# Patient Record
Sex: FEMALE | Race: WHITE | NOT HISPANIC OR LATINO | Employment: FULL TIME | URBAN - NONMETROPOLITAN AREA
[De-identification: names, ages, dates, MRNs, and addresses within clinical notes are randomized per-mention and may not be internally consistent; named-entity substitution may affect disease eponyms.]

---

## 2017-01-06 ENCOUNTER — OFFICE VISIT (OUTPATIENT)
Dept: INTERNAL MEDICINE | Facility: CLINIC | Age: 47
End: 2017-01-06

## 2017-01-06 VITALS
DIASTOLIC BLOOD PRESSURE: 74 MMHG | OXYGEN SATURATION: 98 % | BODY MASS INDEX: 33.17 KG/M2 | TEMPERATURE: 97.6 F | WEIGHT: 187.19 LBS | RESPIRATION RATE: 16 BRPM | HEIGHT: 63 IN | HEART RATE: 94 BPM | SYSTOLIC BLOOD PRESSURE: 118 MMHG

## 2017-01-06 DIAGNOSIS — J01.10 ACUTE NON-RECURRENT FRONTAL SINUSITIS: Primary | ICD-10-CM

## 2017-01-06 PROBLEM — K21.9 GASTROESOPHAGEAL REFLUX DISEASE WITHOUT ESOPHAGITIS: Status: ACTIVE | Noted: 2017-01-06

## 2017-01-06 PROBLEM — L70.0 ACNE VULGARIS: Status: ACTIVE | Noted: 2017-01-06

## 2017-01-06 LAB
EXPIRATION DATE: NORMAL
FLUAV AG NPH QL: NORMAL
FLUBV AG NPH QL: NORMAL
INTERNAL CONTROL: NORMAL
Lab: NORMAL

## 2017-01-06 PROCEDURE — 87804 INFLUENZA ASSAY W/OPTIC: CPT | Performed by: PHYSICIAN ASSISTANT

## 2017-01-06 PROCEDURE — 99213 OFFICE O/P EST LOW 20 MIN: CPT | Performed by: PHYSICIAN ASSISTANT

## 2017-01-06 RX ORDER — AMOXICILLIN AND CLAVULANATE POTASSIUM 875; 125 MG/1; MG/1
1 TABLET, FILM COATED ORAL 2 TIMES DAILY
Qty: 14 TABLET | Refills: 0 | Status: SHIPPED | OUTPATIENT
Start: 2017-01-06 | End: 2017-01-13

## 2017-01-06 RX ORDER — OMEPRAZOLE 20 MG/1
20 CAPSULE, DELAYED RELEASE ORAL DAILY
COMMUNITY

## 2017-01-06 RX ORDER — SPIRONOLACTONE 50 MG/1
100 TABLET, FILM COATED ORAL 2 TIMES DAILY
COMMUNITY

## 2017-01-06 NOTE — MR AVS SNAPSHOT
Arnol RAMACHANDRAN Catrina   1/6/2017 5:30 PM   Office Visit    Provider:  SAMEER York   Department:  [facility] PRIMARY CARE   Dept Phone:  [phone]                Your Full Care Plan              Today's Medication Changes          These changes are accurate as of: 1/6/17  3:29 PM.  If you have any questions, ask your nurse or doctor.               New Medication(s)Ordered:     amoxicillin-clavulanate 875-125 MG per tablet   Commonly known as:  AUGMENTIN   Take 1 tablet by mouth 2 (Two) Times a Day for 7 days.   Started by:  SAMEER York            Where to Get Your Medications      These medications were sent to [id number] - [place], [place] - [address] AT [facility] of [address] - [phone] PH - [phone] FX  [address], [place] [address]     Phone:  [phone]     amoxicillin-clavulanate 875-125 MG per tablet                  Your Updated Medication List          This list is accurate as of: 1/6/17  3:29 PM.  Always use your most recent med list.                amoxicillin-clavulanate 875-125 MG per tablet   Commonly known as:  AUGMENTIN   Take 1 tablet by mouth 2 (Two) Times a Day for 7 days.       JUNEL 1/20 PO       omeprazole 20 MG capsule   Commonly known as:  priLOSEC       spironolactone 50 MG tablet   Commonly known as:  ALDACTONE               We Performed the Following     POC Influenza A / B       You Were Diagnosed With        Codes Comments    Acute non-recurrent frontal sinusitis    -  Primary ICD-10-CM: J01.10  ICD-9-CM: 461.1       Instructions     None    Patient Instructions History      InLive Interactive Signup     [facility] [employer] allows you to send messages to your doctor, view your test results, renew your prescriptions, schedule appointments, and more. To sign up, go to QR Wild and click on the Sign Up Now link in the New User? box. Enter your InLive Interactive Activation Code exactly as it  "appears below along with the last four digits of your Social Security Number and your Date of Birth () to complete the sign-up process. If you do not sign up before the expiration date, you must request a new code.    [employer] Activation Code: [email or web address]  Expires: 2017  3:29 PM    If you have questions, you can email [email or web address] or call [phone] to talk to our [employer] staff. Remember, [employer] is NOT to be used for urgent needs. For medical emergencies, dial 911.               Other Info from Your Visit           Your Appointments     2017  5:30 PM EST   New Patient with SAMEER York   [facility] PRIMARY [facility]ARE (--)    [address] [place] [id number] [phone]           Bring all previous medical records and films, along with current medications and insurance information.              Allergies     Solodyn [Minocycline Hcl Er]      Sulfa Antibiotics        Reason for Visit     Establish Care sinus, headache, fever, cough.       Vital Signs     Blood Pressure Pulse Temperature Respirations Height Weight    118/74 94 97.6 °F (36.4 °C) 16 63\" (160 cm) 187 lb 3 oz (84.9 kg)    Oxygen Saturation Body Mass Index                98% 33.16 kg/m2          Problems and Diagnoses Noted     Acne    Acid reflux disease    Acute non-recurrent frontal sinusitis    -  Primary      Results     POC Influenza A / B      Component Value Standard Range & Units    Rapid Influenza A Ag neg.     Rapid Influenza B Ag neg.     Internal Control Passed Passed    Lot Number [id number]     Expiration Date 10/30/17                   "

## 2017-01-06 NOTE — PROGRESS NOTES
Subjective   Arnol Fritz is a 46 y.o. female who presents to establish care.    Chief Complaint:  Sinus pressure, headache, fever, fatigue, body aches, sore throat and cough for the last 2 days. Fever up to 102.2 and constant if not taking Tylenol every 4 hours. Has not had influenza vaccine this year. Using Tylenol cold/flu severe which is not helping other than fever. Denies SOA.     Acid reflux. Well controlled with prilosec daily.     Acne. Controlled with Spironolactone. Seeing Dr. Mendez at [facility].         The following portions of the patient's history were reviewed and updated as appropriate: allergies, current medications, past family history, past medical history, past social history, past surgical history and problem list.    Review of Systems  Review of Systems   Constitutional: Positive for chills, fatigue and fever. Negative for appetite change and unexpected weight change.        No malaise   HENT: Positive for congestion, postnasal drip, rhinorrhea, sinus pressure, sneezing and sore throat. Negative for ear pain, hearing loss, nosebleeds, trouble swallowing and voice change.    Eyes: Negative for pain, discharge, redness, itching and visual disturbance.        No dry eyes   Respiratory: Positive for cough. Negative for shortness of breath and wheezing.         No SOB with exertion  No SOB lying down   Cardiovascular: Negative for chest pain, palpitations and leg swelling.        No leg cramps     Gastrointestinal: Negative for abdominal pain, blood in stool, constipation, diarrhea, nausea and vomiting.        Reflux.     No change in bowel habits     Endocrine: Negative for cold intolerance, heat intolerance, polydipsia, polyphagia and polyuria.        No hot flashes  No muscle weakness  No feeling of weakness   Genitourinary: Negative for difficulty urinating, dyspareunia, dysuria, frequency, hematuria, menstrual problem, pelvic pain, urgency, vaginal discharge and vaginal pain.       "  No urinary incontinence  No change in periods   Musculoskeletal: Positive for arthralgias. Negative for joint swelling and myalgias.        No limb pain  No limb swelling   Skin: Negative for rash and wound.        No rash  No lesions  No change in mole  No itching   Neurological: Negative for dizziness, seizures, syncope, weakness, numbness and headaches.        No confusion  No tingling  No trouble walking   Hematological: Negative for adenopathy. Does not bruise/bleed easily.   Psychiatric/Behavioral: Negative for confusion, dysphoric mood, sleep disturbance and suicidal ideas. The patient is not nervous/anxious.         No change in personality         Objective    Visit Vitals   • /74   • Pulse 94   • Temp 97.6 °F (36.4 °C)   • Resp 16   • Ht 63\" (160 cm)   • Wt 187 lb 3 oz (84.9 kg)   • SpO2 98%   • BMI 33.16 kg/m2     Physical Exam   Constitutional: She is oriented to person, place, and time. She appears well-developed and well-nourished. No distress.   HENT:   Head: Normocephalic and atraumatic.   Right Ear: External ear normal.   Left Ear: External ear normal.   Nose: Nose normal.   White postnasal drip. Frontal sinus tenderness.    Eyes: EOM are normal. Pupils are equal, round, and reactive to light.   Neck: Normal range of motion. Neck supple. No thyromegaly present.   Cardiovascular: Normal rate, regular rhythm and normal heart sounds.  Exam reveals no gallop and no friction rub.    No murmur heard.  Pulmonary/Chest: Effort normal and breath sounds normal. No respiratory distress. She has no wheezes. She has no rales. She exhibits no tenderness.   Abdominal: Soft. Bowel sounds are normal. She exhibits no distension and no mass. There is no tenderness. There is no rebound and no guarding. No hernia.   Musculoskeletal: Normal range of motion. She exhibits no edema, tenderness or deformity.   Lymphadenopathy:     She has no cervical adenopathy.   Neurological: She is alert and oriented to person, " place, and time. No cranial nerve deficit. She exhibits normal muscle tone.   Skin: Skin is warm and dry. No rash noted. She is not diaphoretic.   Psychiatric: She has a normal mood and affect. Her behavior is normal. Judgment and thought content normal.   Nursing note and vitals reviewed.        Assessment/Plan      Diagnosis Plan   1. Acute non-recurrent frontal sinusitis  amoxicillin-clavulanate (AUGMENTIN) 875-125 MG per tablet       Influenza A & B: negative.       -not a new patient, unable to find record in the system but she is sure she was here within the last 12 months.

## 2017-01-16 ENCOUNTER — TELEPHONE (OUTPATIENT)
Dept: INTERNAL MEDICINE | Facility: CLINIC | Age: 47
End: 2017-01-16

## 2017-01-16 RX ORDER — FLUCONAZOLE 150 MG/1
150 TABLET ORAL ONCE
Qty: 1 TABLET | Refills: 0 | Status: SHIPPED | OUTPATIENT
Start: 2017-01-16 | End: 2017-01-16

## 2017-01-16 NOTE — TELEPHONE ENCOUNTER
----- Message from Nicole Berg sent at 1/16/2017 10:38 AM EST -----  Contact: Patient   Patient called and stated that she was seen about a week ago with Meghann edwards she mentioned that she could call back if she needed a Rx for Diflucan. She was wanting to see if that could get sent in to the [facility] in [place].

## 2017-03-09 ENCOUNTER — TELEPHONE (OUTPATIENT)
Dept: OBSTETRICS AND GYNECOLOGY | Facility: CLINIC | Age: 47
End: 2017-03-09

## 2017-03-09 RX ORDER — NORETHINDRONE ACETATE AND ETHINYL ESTRADIOL 1; .02 MG/1; MG/1
1 TABLET ORAL DAILY
Qty: 28 TABLET | Refills: 9 | Status: SHIPPED | OUTPATIENT
Start: 2017-03-09 | End: 2017-04-06

## 2017-03-09 NOTE — TELEPHONE ENCOUNTER
----- Message from Helena Messina sent at 3/9/2017  1:17 PM EST -----  Contact: PT  PT IS HAVING PROBLEMS GETTING SCRIPT FILLED.  REQUESTING THE SCRIPT TO BE SENT BACK INTO PHARMACY TO SHOW SHE HAS REFILLS LEFT.     [phone]  DAMIEN ROBERSON

## 2017-03-22 ENCOUNTER — TELEPHONE (OUTPATIENT)
Dept: OBSTETRICS AND GYNECOLOGY | Facility: CLINIC | Age: 47
End: 2017-03-22

## 2017-03-22 NOTE — TELEPHONE ENCOUNTER
The wrong RX was sent in to pt's pharmacy, supposed to be the junel fe (28) day, but the junel (21) was sent in. I just wanted to make sure it was okay to send in the correct medicine with refills.

## 2017-03-23 RX ORDER — NORETHINDRONE ACETATE AND ETHINYL ESTRADIOL 1MG-20(21)
1 KIT ORAL DAILY
Qty: 28 TABLET | Refills: 7 | Status: SHIPPED | OUTPATIENT
Start: 2017-03-23 | End: 2017-08-07 | Stop reason: SDUPTHER

## 2017-04-20 ENCOUNTER — TELEPHONE (OUTPATIENT)
Dept: OBSTETRICS AND GYNECOLOGY | Facility: CLINIC | Age: 47
End: 2017-04-20

## 2017-04-20 NOTE — TELEPHONE ENCOUNTER
----- Message from Yi Maier sent at 4/20/2017  9:49 AM EDT -----  Contact: PT  THIS IS KAROL'S PT.  SHE GOT A CALL FROM [facility] ABOUT NEEDING A F/U BREAST MRI.  CAN WE SEND ORDER, AND DOES IT NEED PRE-CERT?  THANKS

## 2017-04-20 NOTE — TELEPHONE ENCOUNTER
Ok  Patient is due for annual in July so we can discuss then as August would be 6 months time frame when MRI could be done

## 2017-04-20 NOTE — TELEPHONE ENCOUNTER
I spoke with patient, She stated that she has a large family history of breast and cervical cancer, and the  breast center wanted her to go ahead and do the high risk or additional screening. They mentioned waiting till September to do anymore screening so that there was at least 6 months between.

## 2017-04-20 NOTE — TELEPHONE ENCOUNTER
Patient has a normal mammogram in her chart from [facility] done February 2017 so not sure what this is about--need more information-thanks

## 2017-05-19 ENCOUNTER — OFFICE VISIT (OUTPATIENT)
Dept: INTERNAL MEDICINE | Facility: CLINIC | Age: 47
End: 2017-05-19

## 2017-05-19 VITALS
TEMPERATURE: 97.8 F | WEIGHT: 187.2 LBS | HEIGHT: 63 IN | DIASTOLIC BLOOD PRESSURE: 62 MMHG | BODY MASS INDEX: 33.17 KG/M2 | OXYGEN SATURATION: 80 % | SYSTOLIC BLOOD PRESSURE: 117 MMHG | HEART RATE: 100 BPM

## 2017-05-19 DIAGNOSIS — Z00.00 PREVENTATIVE HEALTH CARE: ICD-10-CM

## 2017-05-19 DIAGNOSIS — N92.6 IRREGULAR MENSES: ICD-10-CM

## 2017-05-19 DIAGNOSIS — R53.82 CHRONIC FATIGUE: ICD-10-CM

## 2017-05-19 DIAGNOSIS — E65 BUFFALO HUMP: ICD-10-CM

## 2017-05-19 DIAGNOSIS — E01.0 THYROMEGALY: ICD-10-CM

## 2017-05-19 DIAGNOSIS — R68.89 COLD INTOLERANCE: Primary | ICD-10-CM

## 2017-05-19 DIAGNOSIS — R13.12 OROPHARYNGEAL DYSPHAGIA: ICD-10-CM

## 2017-05-19 PROCEDURE — 99214 OFFICE O/P EST MOD 30 MIN: CPT | Performed by: PHYSICIAN ASSISTANT

## 2017-05-20 LAB
ALBUMIN SERPL-MCNC: 4.2 G/DL (ref 3.5–5)
ALBUMIN/GLOB SERPL: 1.4 G/DL (ref 1–2)
ALP SERPL-CCNC: 56 U/L (ref 38–126)
ALT SERPL-CCNC: 18 U/L (ref 13–69)
AST SERPL-CCNC: 19 U/L (ref 15–46)
BASOPHILS # BLD AUTO: 0.03 10*3/MM3 (ref 0–0.2)
BASOPHILS NFR BLD AUTO: 0.6 % (ref 0–2.5)
BILIRUB SERPL-MCNC: 0.4 MG/DL (ref 0.2–1.3)
BUN SERPL-MCNC: 9 MG/DL (ref 7–20)
BUN/CREAT SERPL: 9 (ref 7.1–23.5)
CALCIUM SERPL-MCNC: 9.8 MG/DL (ref 8.4–10.2)
CHLORIDE SERPL-SCNC: 106 MMOL/L (ref 98–107)
CHOLEST SERPL-MCNC: 198 MG/DL (ref 0–199)
CO2 SERPL-SCNC: 23 MMOL/L (ref 26–30)
CORTIS SERPL-MCNC: 24.8 UG/DL
CREAT SERPL-MCNC: 1 MG/DL (ref 0.6–1.3)
EOSINOPHIL # BLD AUTO: 0.1 10*3/MM3 (ref 0–0.7)
EOSINOPHIL NFR BLD AUTO: 2 % (ref 0–7)
ERYTHROCYTE [DISTWIDTH] IN BLOOD BY AUTOMATED COUNT: 13.4 % (ref 11.5–14.5)
GLOBULIN SER CALC-MCNC: 3.1 GM/DL
GLUCOSE SERPL-MCNC: 109 MG/DL (ref 74–98)
HCT VFR BLD AUTO: 40.8 % (ref 37–47)
HDLC SERPL-MCNC: 43 MG/DL (ref 40–60)
HGB BLD-MCNC: 13.2 G/DL (ref 12–16)
IMM GRANULOCYTES # BLD: 0.01 10*3/MM3 (ref 0–0.06)
IMM GRANULOCYTES NFR BLD: 0.2 % (ref 0–0.6)
LDLC SERPL CALC-MCNC: 122 MG/DL (ref 0–99)
LYMPHOCYTES # BLD AUTO: 1.57 10*3/MM3 (ref 0.6–3.4)
LYMPHOCYTES NFR BLD AUTO: 32 % (ref 10–50)
MCH RBC QN AUTO: 28.4 PG (ref 27–31)
MCHC RBC AUTO-ENTMCNC: 32.4 G/DL (ref 30–37)
MCV RBC AUTO: 87.7 FL (ref 81–99)
MONOCYTES # BLD AUTO: 0.5 10*3/MM3 (ref 0–0.9)
MONOCYTES NFR BLD AUTO: 10.2 % (ref 0–12)
NEUTROPHILS # BLD AUTO: 2.7 10*3/MM3 (ref 2–6.9)
NEUTROPHILS NFR BLD AUTO: 55 % (ref 37–80)
NRBC BLD AUTO-RTO: 0 /100 WBC (ref 0–0)
PLATELET # BLD AUTO: 244 10*3/MM3 (ref 130–400)
POTASSIUM SERPL-SCNC: 4.5 MMOL/L (ref 3.5–5.1)
PROT SERPL-MCNC: 7.3 G/DL (ref 6.3–8.2)
RBC # BLD AUTO: 4.65 10*6/MM3 (ref 4.2–5.4)
SODIUM SERPL-SCNC: 143 MMOL/L (ref 137–145)
T3FREE SERPL-MCNC: 3.2 PG/ML (ref 2–4.4)
T4 SERPL-MCNC: 10.3 UG/DL (ref 4.5–12)
THYROPEROXIDASE AB SERPL-ACNC: 13 IU/ML (ref 0–34)
TRIGL SERPL-MCNC: 164 MG/DL
TSH SERPL DL<=0.005 MIU/L-ACNC: 2.18 MIU/ML (ref 0.47–4.68)
VLDLC SERPL CALC-MCNC: 32.8 MG/DL
WBC # BLD AUTO: 4.91 10*3/MM3 (ref 4.8–10.8)

## 2017-05-22 ENCOUNTER — HOSPITAL ENCOUNTER (OUTPATIENT)
Dept: ULTRASOUND IMAGING | Facility: HOSPITAL | Age: 47
Discharge: HOME | End: 2017-05-22
Admitting: PHYSICIAN ASSISTANT

## 2017-05-22 DIAGNOSIS — E24.9 HYPERCORTISOLISM (HCC): Primary | ICD-10-CM

## 2017-05-22 PROCEDURE — 76536 US EXAM OF HEAD AND NECK: CPT

## 2017-06-01 LAB
CORTIS F 24H UR-MRATE: 17 UG/24 HR (ref 0–50)
CORTIS F UR-MCNC: 29 UG/L

## 2017-08-08 NOTE — TELEPHONE ENCOUNTER
Patient may have one refill of 90 days-please inform her her annual was due in July and will need annual before further refills-thanks

## 2017-08-09 RX ORDER — NORETHINDRONE ACETATE AND ETHINYL ESTRADIOL AND FERROUS FUMARATE 1MG-20(21)
KIT ORAL
Qty: 84 TABLET | Refills: 0 | Status: SHIPPED | OUTPATIENT
Start: 2017-08-09 | End: 2019-05-09 | Stop reason: ALTCHOICE

## 2017-09-06 RX ORDER — PERMETHRIN 50 MG/G
CREAM TOPICAL ONCE
Qty: 60 G | Refills: 1 | Status: SHIPPED | OUTPATIENT
Start: 2017-09-06 | End: 2017-09-06

## 2017-10-25 ENCOUNTER — OFFICE VISIT (OUTPATIENT)
Dept: OBSTETRICS AND GYNECOLOGY | Facility: CLINIC | Age: 47
End: 2017-10-25

## 2017-10-25 VITALS
WEIGHT: 193 LBS | DIASTOLIC BLOOD PRESSURE: 79 MMHG | HEIGHT: 63 IN | SYSTOLIC BLOOD PRESSURE: 112 MMHG | BODY MASS INDEX: 34.2 KG/M2

## 2017-10-25 DIAGNOSIS — R63.5 WEIGHT GAIN: ICD-10-CM

## 2017-10-25 DIAGNOSIS — Z01.419 ENCOUNTER FOR GYNECOLOGICAL EXAMINATION WITHOUT ABNORMAL FINDING: Primary | ICD-10-CM

## 2017-10-25 DIAGNOSIS — Z12.4 SCREENING FOR MALIGNANT NEOPLASM OF CERVIX: ICD-10-CM

## 2017-10-25 DIAGNOSIS — Z84.81 FAMILY HISTORY OF BREAST CANCER GENE MUTATION IN FIRST DEGREE RELATIVE: ICD-10-CM

## 2017-10-25 DIAGNOSIS — Z80.3 FAMILY HISTORY OF BREAST CANCER IN MOTHER: ICD-10-CM

## 2017-10-25 PROCEDURE — 99396 PREV VISIT EST AGE 40-64: CPT | Performed by: PHYSICIAN ASSISTANT

## 2017-10-25 NOTE — PROGRESS NOTES
Subjective   Chief Complaint   Patient presents with   • Gynecologic Exam     Last pap: 16 WNL       Arnol Fritz is a 47 y.o. year old  presenting to be seen for her annual gynecological exam.   She is on microgestin fe  for cycle control and desires to continue-has had tubal-does not use tobacco products  Patient's last menstrual period was 10/16/2017 (exact date).   She reports concern with gradual weight gain, difficulty sleeping-she has had thyroid labs with pcp and were normal in May 2017  She is wondering if complaints due to menopause  She has family history of breast cancer in her mother, maternal grandmother and 3 maternal aunts-her mother had positive genetic testing for breast cancer  Patient does not desire genetic testing for breast cancer  It has been just over 1 year since her last mammogram        Past Medical History:   Diagnosis Date   • Acid reflux    • Anemia    • History of Papanicolaou smear of cervix 2016    WNL        Current Outpatient Prescriptions:   •  MICROGESTIN FE  1-20 MG-MCG per tablet, TAKE 1 TABLET DAILY, Disp: 84 tablet, Rfl: 0  •  omeprazole (priLOSEC) 20 MG capsule, 20 mg Daily., Disp: , Rfl:   •  spironolactone (ALDACTONE) 50 MG tablet, Take 50 mg by mouth 2 (Two) Times a Day., Disp: , Rfl:    Allergies   Allergen Reactions   • Solodyn [Minocycline Hcl Er]    • Sulfa Antibiotics       Past Surgical History:   Procedure Laterality Date   • DILATATION AND CURETTAGE     • TUBAL ABDOMINAL LIGATION     • WISDOM TOOTH EXTRACTION        Social History     Social History   • Marital status:      Spouse name: N/A   • Number of children: N/A   • Years of education: N/A     Occupational History   • Not on file.     Social History Main Topics   • Smoking status: Never Smoker   • Smokeless tobacco: Never Used   • Alcohol use No   • Drug use: No   • Sexual activity: Defer      Comment: Tubal     Other Topics Concern   • Not on file     Social History  "Narrative      Family History   Problem Relation Age of Onset   • Thyroid disease Mother    • Arthritis Father    • Ovarian cancer Neg Hx    • Breast cancer Neg Hx        Review of Systems   Constitutional:        Weight gain   Gastrointestinal: Negative.    Genitourinary: Negative.    Hematological: Bruises/bleeds easily.   Psychiatric/Behavioral: Positive for sleep disturbance.           Objective   /79  Ht 63\" (160 cm)  Wt 193 lb (87.5 kg)  LMP 10/16/2017 (Exact Date)  BMI 34.19 kg/m2    Physical Exam   Constitutional: She appears well-developed and well-nourished. She is cooperative.   Pulmonary/Chest: Right breast exhibits no inverted nipple, no mass, no nipple discharge, no skin change and no tenderness. Left breast exhibits no inverted nipple, no mass, no nipple discharge, no skin change and no tenderness.   Abdominal: Soft. Normal appearance. There is no hepatosplenomegaly. There is no tenderness.   Genitourinary: Vagina normal and uterus normal. There is no tenderness or lesion on the right labia. There is no tenderness or lesion on the left labia. Cervix exhibits no motion tenderness and no discharge. Right adnexum displays no mass and no tenderness. Left adnexum displays no mass and no tenderness.   Neurological: She is alert.   Skin: Skin is warm, dry and intact.   Psychiatric: She has a normal mood and affect. Her behavior is normal.            Assessment and Plan  Arnol was seen today for gynecologic exam.    Diagnoses and all orders for this visit:    Encounter for gynecological examination without abnormal finding    Screening for malignant neoplasm of cervix  -     Liquid-based Pap Smear, Screening - ThinPrep Vial, Cervix; Future  -     Liquid-based Pap Smear, Screening - ThinPrep Vial, Cervix    Family history of breast cancer in mother  -     MRI Breast Bilateral With & Without Contrast; Future    Family history of breast cancer gene mutation in first degree relative  -     MRI Breast " Bilateral With & Without Contrast; Future    Weight gain  -     Estradiol; Future  -     Follicle Stimulating Hormone; Future  -     Luteinizing Hormone; Future  -     Progesterone; Future      Patient Instructions   Given strong family history of breast cancer and her mother with positive genetic testing will try to obtain breast MRI  Patient also desiring labs to check hormone levels-patient is willing to d/c oc's for 6 weeks then will return for labs    Self breast exam monthly  Regular exercise  Annual mammogram              This note was electronically signed.    Shahida Berman PA-C   October 26, 2017

## 2017-10-26 NOTE — PATIENT INSTRUCTIONS
Given strong family history of breast cancer and her mother with positive genetic testing will try to obtain breast MRI  Patient also desiring labs to check hormone levels-patient is willing to d/c oc's for 6 weeks then will return for labs    Self breast exam monthly  Regular exercise  Annual mammogram

## 2017-10-31 ENCOUNTER — RESULTS ENCOUNTER (OUTPATIENT)
Dept: OBSTETRICS AND GYNECOLOGY | Facility: CLINIC | Age: 47
End: 2017-10-31

## 2017-10-31 DIAGNOSIS — R63.5 WEIGHT GAIN: ICD-10-CM

## 2017-11-20 ENCOUNTER — HOSPITAL ENCOUNTER (OUTPATIENT)
Dept: MRI IMAGING | Facility: HOSPITAL | Age: 47
Discharge: HOME | End: 2017-11-20

## 2017-12-01 RX ORDER — FLUCONAZOLE 150 MG/1
150 TABLET ORAL ONCE
Qty: 1 TABLET | Refills: 0 | Status: SHIPPED | OUTPATIENT
Start: 2017-12-01 | End: 2017-12-01

## 2017-12-01 NOTE — TELEPHONE ENCOUNTER
Patient was seen at a [facility] over the holiday and was prescribed an antibiotic. She would like a script called for diflucan sent to [facility].

## 2017-12-05 ENCOUNTER — OFFICE VISIT (OUTPATIENT)
Dept: INTERNAL MEDICINE | Facility: CLINIC | Age: 47
End: 2017-12-05

## 2017-12-05 VITALS
HEIGHT: 63 IN | DIASTOLIC BLOOD PRESSURE: 70 MMHG | OXYGEN SATURATION: 100 % | TEMPERATURE: 98.8 F | BODY MASS INDEX: 33.49 KG/M2 | SYSTOLIC BLOOD PRESSURE: 108 MMHG | WEIGHT: 189 LBS | HEART RATE: 91 BPM

## 2017-12-05 DIAGNOSIS — J22 ACUTE LOWER RESPIRATORY INFECTION: Primary | ICD-10-CM

## 2017-12-05 PROCEDURE — 99213 OFFICE O/P EST LOW 20 MIN: CPT | Performed by: PHYSICIAN ASSISTANT

## 2017-12-05 RX ORDER — AZITHROMYCIN 250 MG/1
TABLET, FILM COATED ORAL
Qty: 6 TABLET | Refills: 0 | Status: SHIPPED | OUTPATIENT
Start: 2017-12-05 | End: 2017-12-29

## 2017-12-05 RX ORDER — DEXTROMETHORPHAN HYDROBROMIDE AND PROMETHAZINE HYDROCHLORIDE 15; 6.25 MG/5ML; MG/5ML
10 SYRUP ORAL NIGHTLY PRN
Qty: 180 ML | Refills: 0 | Status: SHIPPED | OUTPATIENT
Start: 2017-12-05 | End: 2017-12-29

## 2017-12-05 RX ORDER — PREDNISONE 20 MG/1
20 TABLET ORAL DAILY
Qty: 3 TABLET | Refills: 0 | Status: SHIPPED | OUTPATIENT
Start: 2017-12-05 | End: 2017-12-29

## 2017-12-05 NOTE — PROGRESS NOTES
Arnol Fritz is a 47 y.o. female.     Subjective   History of Present Illness   Was treated for bronchitis about 1.5 weeks ago from [facility] and did not improve with Keflex tid x 7 days. Tessalon Perles and Flonase also did not help. She reports SOA, chest tightness, persistent cough, wheezing and green sputum production. Has had chills and fever up to 101 for the last week. Also reports fatigue.       The following portions of the patient's history were reviewed and updated as appropriate: allergies, current medications, past family history, past medical history, past social history, past surgical history and problem list.    Review of Systems    Constitutional: chills, fatigue, fever. Negative for appetite change and unexpected weight change.   HENT: Negative for congestion, ear pain, hearing loss, nosebleeds, postnasal drip, rhinorrhea, sore throat, tinnitus and trouble swallowing.    Eyes: Negative for photophobia, discharge and visual disturbance.   Respiratory: Cough, chest tightness, shortness of breath and wheezing.    Cardiovascular: Negative for chest pain, palpitations and leg swelling.   Gastrointestinal: Negative for abdominal distention, abdominal pain, blood in stool, constipation, diarrhea, nausea and vomiting.   Endocrine: Negative for cold intolerance, heat intolerance, polydipsia, polyphagia and polyuria.   Musculoskeletal: Negative for arthralgias, back pain, joint swelling, myalgias, neck pain and neck stiffness.   Skin: Negative for color change, pallor, rash and wound.   Allergic/Immunologic: Negative for environmental allergies, food allergies and immunocompromised state.   Neurological: Negative for dizziness, tremors, seizures, weakness, numbness and headaches.   Hematological: Negative for adenopathy. Does not bruise/bleed easily.   Psychiatric/Behavioral: Negative for sleep disturbances, agitation, behavioral problems, confusion, hallucinations, self-injury and suicidal ideas.  "The patient is not nervous/anxious.      Objective    Physical Exam  Constitutional: Oriented to person, place, and time. Appears well-developed and well-nourished.   HENT: OP normal. TMs joaquin.   Head: little maxillary sinus tenderness. Normocephalic and atraumatic.   Eyes: EOM are normal. Pupils are equal, round, and reactive to light.   Neck: Normal range of motion. Neck supple.   Cardiovascular: Normal rate, regular rhythm and normal heart sounds.    Pulmonary/Chest: coarse breath sounds with scattered rhonchi. Effort normal. No respiratory distress.  Has no wheezes or rales. Exhibits no chest wall tenderness.   Abdominal: Soft. Bowel sounds are normal. Exhibits no distension and no mass. There is no tenderness.   Musculoskeletal: Normal range of motion. Exhibits no tenderness.   Neurological: Alert and oriented to person, place, and time.   Skin: Skin is warm and dry.   Psychiatric: Has a normal mood and affect. Behavior is normal. Judgment and thought content normal.       /70  Pulse 91  Temp 98.8 °F (37.1 °C)  Ht 160 cm (62.99\")  Wt 85.7 kg (189 lb)  SpO2 100%  BMI 33.49 kg/m2    Nursing note and vitals reviewed.        Assessment/Plan   Arnol was seen today for cough and shortness of breath.    Diagnoses and all orders for this visit:    Acute lower respiratory infection  -     azithromycin (ZITHROMAX Z-CORINNA) 250 MG tablet; Take 2 tablets the first day, then 1 tablet daily for 4 days.  -     predniSONE (DELTASONE) 20 MG tablet; Take 1 tablet by mouth Daily.  -     promethazine-dextromethorphan (PROMETHAZINE-DM) 6.25-15 MG/5ML syrup; Take 10 mL by mouth At Night As Needed for Cough.      Call or RTC if symptoms worsen or persist.          "

## 2017-12-23 LAB
ESTRADIOL SERPL-MCNC: 17.3 PG/ML
FSH SERPL-ACNC: 42.6 MIU/ML
LH SERPL-ACNC: 24.6 MIU/ML
PROGEST SERPL-MCNC: 0.2 NG/ML

## 2017-12-28 ENCOUNTER — TELEPHONE (OUTPATIENT)
Dept: INTERNAL MEDICINE | Facility: CLINIC | Age: 47
End: 2017-12-28

## 2017-12-29 ENCOUNTER — HOSPITAL ENCOUNTER (OUTPATIENT)
Dept: GENERAL RADIOLOGY | Facility: HOSPITAL | Age: 47
Discharge: HOME | End: 2017-12-29
Admitting: PHYSICIAN ASSISTANT

## 2017-12-29 ENCOUNTER — OFFICE VISIT (OUTPATIENT)
Dept: INTERNAL MEDICINE | Facility: CLINIC | Age: 47
End: 2017-12-29

## 2017-12-29 VITALS
HEART RATE: 90 BPM | TEMPERATURE: 98.5 F | WEIGHT: 190 LBS | HEIGHT: 63 IN | BODY MASS INDEX: 33.66 KG/M2 | DIASTOLIC BLOOD PRESSURE: 76 MMHG | SYSTOLIC BLOOD PRESSURE: 110 MMHG | OXYGEN SATURATION: 100 %

## 2017-12-29 DIAGNOSIS — J18.9 PNEUMONIA OF LEFT LOWER LOBE DUE TO INFECTIOUS ORGANISM: Primary | ICD-10-CM

## 2017-12-29 PROCEDURE — 99213 OFFICE O/P EST LOW 20 MIN: CPT | Performed by: PHYSICIAN ASSISTANT

## 2017-12-29 PROCEDURE — 71020 HC CHEST PA AND LATERAL: CPT

## 2017-12-29 RX ORDER — ALBUTEROL SULFATE 90 UG/1
2 AEROSOL, METERED RESPIRATORY (INHALATION) EVERY 4 HOURS PRN
Qty: 1 INHALER | Refills: 2 | Status: SHIPPED | OUTPATIENT
Start: 2017-12-29 | End: 2019-05-09

## 2017-12-29 RX ORDER — DEXTROMETHORPHAN HYDROBROMIDE AND PROMETHAZINE HYDROCHLORIDE 15; 6.25 MG/5ML; MG/5ML
10 SYRUP ORAL NIGHTLY PRN
Qty: 180 ML | Refills: 0 | Status: SHIPPED | OUTPATIENT
Start: 2017-12-29 | End: 2018-03-21

## 2017-12-29 RX ORDER — METHYLPREDNISOLONE 4 MG/1
TABLET ORAL
Qty: 1 EACH | Refills: 0 | Status: SHIPPED | OUTPATIENT
Start: 2017-12-29 | End: 2018-03-21

## 2017-12-29 RX ORDER — CIPROFLOXACIN 500 MG/1
500 TABLET, FILM COATED ORAL EVERY 12 HOURS SCHEDULED
Qty: 14 TABLET | Refills: 0 | Status: SHIPPED | OUTPATIENT
Start: 2017-12-29 | End: 2018-01-05

## 2017-12-29 NOTE — PROGRESS NOTES
Arnol Fritz is a 47 y.o. female.     Subjective   History of Present Illness   Was diagnosed with pneumonia about 3 weeks ago and treated with zithromax, prednisone and promethazine-dm cough syrup which only helped minimally. She continue to cough, have low-grade fevers, chest tightness, fatigue, poor appetite, nasal congestion, wheezing and SOA. Feels like she is smothering when she lays flat. Denies leg edema or chest pain.  Claritin and Flonase help nasal congestion and the mucus has changed from green to clear.         The following portions of the patient's history were reviewed and updated as appropriate: allergies, current medications, past family history, past medical history, past social history, past surgical history and problem list.    Review of Systems    Constitutional:  fatigue, fever. Negative for appetite change, chills and unexpected weight change.   HENT: congestion.  Negative for ear pain, hearing loss, nosebleeds, postnasal drip, rhinorrhea, sore throat, tinnitus and trouble swallowing.    Eyes: Negative for photophobia, discharge and visual disturbance.   Respiratory: Cough, chest tightness, shortness of breath and wheezing.    Cardiovascular: Negative for chest pain, palpitations and leg swelling.   Gastrointestinal: Negative for abdominal distention, abdominal pain, blood in stool, constipation, diarrhea, nausea and vomiting.   Endocrine: Negative for cold intolerance, heat intolerance, polydipsia, polyphagia and polyuria.   Musculoskeletal: Negative for arthralgias, back pain, joint swelling, myalgias, neck pain and neck stiffness.   Skin: Negative for color change, pallor, rash and wound.   Allergic/Immunologic: Negative for environmental allergies, food allergies and immunocompromised state.   Neurological: Negative for dizziness, tremors, seizures, weakness, numbness and headaches.   Hematological: Negative for adenopathy. Does not bruise/bleed easily.   Psychiatric/Behavioral: Negative  "for sleep disturbances, agitation, behavioral problems, confusion, hallucinations, self-injury and suicidal ideas. The patient is not nervous/anxious.      Objective    Physical Exam  Constitutional: Oriented to person, place, and time. Appears well-developed and well-nourished.   HENT: OP normal.   Head: Normocephalic and atraumatic.   Eyes: EOM are normal. Pupils are equal, round, and reactive to light.   Neck: Normal range of motion. Neck supple.   Cardiovascular: Normal rate, regular rhythm and normal heart sounds.    Pulmonary/Chest: rales left upper lobe, few crackles left lower lobe, right lobes normal. Few scattered wheezes. Effort normal.  No respiratory distress.  Exhibits no chest wall tenderness.   Abdominal: Soft. Bowel sounds are normal. Exhibits no distension and no mass. There is no tenderness.   Musculoskeletal: Normal range of motion. Exhibits no tenderness.   Neurological: Alert and oriented to person, place, and time.   Skin: Skin is warm and dry.   Psychiatric: Has a normal mood and affect. Behavior is normal. Judgment and thought content normal.       /76  Pulse 90  Temp 98.5 °F (36.9 °C)  Ht 160 cm (62.99\")  Wt 86.2 kg (190 lb)  SpO2 100%  BMI 33.67 kg/m2    Nursing note and vitals reviewed.        Assessment/Plan   Arnol was seen today for follow-up.    Diagnoses and all orders for this visit:    Pneumonia of left lower lobe due to infectious organism  -     XR Chest PA & Lateral  -     albuterol (PROVENTIL HFA;VENTOLIN HFA) 108 (90 Base) MCG/ACT inhaler; Inhale 2 puffs Every 4 (Four) Hours As Needed for Wheezing or Shortness of Air.  -     MethylPREDNISolone (MEDROL, CORINNA,) 4 MG tablet; Take as directed on package instructions.  -     ciprofloxacin (CIPRO) 500 MG tablet; Take 1 tablet by mouth Every 12 (Twelve) Hours for 7 days.  -     promethazine-dextromethorphan (PROMETHAZINE-DM) 6.25-15 MG/5ML syrup; Take 10 mL by mouth At Night As Needed for Cough.      Call or RTC if " symptoms worsen or persist.

## 2018-03-21 ENCOUNTER — OFFICE VISIT (OUTPATIENT)
Dept: INTERNAL MEDICINE | Facility: CLINIC | Age: 48
End: 2018-03-21

## 2018-03-21 VITALS
HEART RATE: 114 BPM | TEMPERATURE: 99.5 F | BODY MASS INDEX: 34.02 KG/M2 | OXYGEN SATURATION: 98 % | DIASTOLIC BLOOD PRESSURE: 68 MMHG | SYSTOLIC BLOOD PRESSURE: 112 MMHG | WEIGHT: 192 LBS | HEIGHT: 63 IN

## 2018-03-21 DIAGNOSIS — R50.9 FEVER, UNSPECIFIED FEVER CAUSE: Primary | ICD-10-CM

## 2018-03-21 DIAGNOSIS — J10.1 INFLUENZA B: ICD-10-CM

## 2018-03-21 LAB
EXPIRATION DATE: ABNORMAL
FLUAV AG NPH QL: NEGATIVE
FLUBV AG NPH QL: POSITIVE
INTERNAL CONTROL: ABNORMAL
Lab: ABNORMAL

## 2018-03-21 PROCEDURE — 99213 OFFICE O/P EST LOW 20 MIN: CPT | Performed by: PHYSICIAN ASSISTANT

## 2018-03-21 PROCEDURE — 87804 INFLUENZA ASSAY W/OPTIC: CPT | Performed by: PHYSICIAN ASSISTANT

## 2018-03-21 RX ORDER — OSELTAMIVIR PHOSPHATE 75 MG/1
75 CAPSULE ORAL 2 TIMES DAILY
Qty: 10 CAPSULE | Refills: 0 | Status: SHIPPED | OUTPATIENT
Start: 2018-03-21 | End: 2019-05-09

## 2018-03-21 NOTE — PROGRESS NOTES
"Subjective     Chief Complaint: fever, cough    History of Present Illness     Arnol Fritz is a 47 y.o. female presenting with complaints of fever, body aches, HA, sore throat, productive cough beginning yesterday morning. States 2 family members she was visiting with over the weekend were recently diagnosed with flu. She's been using her albuterol inhaler every 4-6 hours for SOA and mucinex to help with congestion.     The following portions of the patient's history were reviewed and updated as appropriate: current medications, allergies, PMH.    Review of Systems   Constitutional: Positive for chills, fatigue and fever.   HENT: Positive for congestion and sore throat.    Respiratory: Positive for cough, shortness of breath and wheezing.    Musculoskeletal: Positive for myalgias.   Neurological: Positive for headaches.       Objective     Vitals:    03/21/18 0946   BP: 112/68   Pulse: 114   Temp: 99.5 °F (37.5 °C)   SpO2: 98%   Weight: 87.1 kg (192 lb)   Height: 160 cm (62.99\")     Physical Exam   Constitutional: Ill appearing.  HENT:   Right Ear: Tympanic membrane and external ear normal.   Left Ear: Tympanic membrane and external ear normal.   Nose: Nose normal.   Mouth/Throat: OP erythema, edema.  Eyes: EOM are normal. Pupils are equal, round, and reactive to light.   Neck: Normal range of motion. Neck supple.   Cardiovascular: Normal rate, regular rhythm and normal heart sounds.    Pulmonary/Chest: Effort normal and breath sounds normal. (used inhaler 1 hour ago)  Abdominal: Soft. Bowel sounds are normal.  Musculoskeletal: Normal range of motion.   Lymphadenopathy: No cervical adenopathy noted.   Neurological: Alert and oriented to person, place, and time.   Skin: Skin is warm and dry.   Psychiatric: Exhibits a normal mood and affect.     Assessment/Plan     Diagnoses and all orders for this visit:    Fever, unspecified fever cause  -     POCT Influenza A/B    Influenza B  -     oseltamivir (TAMIFLU) 75 MG " capsule; Take 1 capsule by mouth 2 (Two) Times a Day.      Influenza B positive.  Encouraged rest, fluids, tylenol.  Return to work protocol discussed.    Lena Ragland PA-C  03/21/2018         Please note that portions of this note were completed with a voice recognition program. Efforts were made to edit dictation, but occasionally words are mistranscribed.

## 2019-04-23 ENCOUNTER — HOSPITAL ENCOUNTER (EMERGENCY)
Facility: HOSPITAL | Age: 49
Discharge: HOME | End: 2019-04-23
Attending: EMERGENCY MEDICINE | Admitting: EMERGENCY MEDICINE

## 2019-04-23 ENCOUNTER — APPOINTMENT (OUTPATIENT)
Dept: GENERAL RADIOLOGY | Facility: HOSPITAL | Age: 49
End: 2019-04-23

## 2019-04-23 VITALS
WEIGHT: 198.4 LBS | SYSTOLIC BLOOD PRESSURE: 120 MMHG | HEART RATE: 84 BPM | DIASTOLIC BLOOD PRESSURE: 84 MMHG | RESPIRATION RATE: 18 BRPM | OXYGEN SATURATION: 95 % | HEIGHT: 62 IN | TEMPERATURE: 98.4 F | BODY MASS INDEX: 36.51 KG/M2

## 2019-04-23 DIAGNOSIS — S39.012A STRAIN OF LUMBAR PARASPINOUS MUSCLE, INITIAL ENCOUNTER: ICD-10-CM
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
## 2019-04-23 DIAGNOSIS — M62.838 CERVICAL PARASPINAL MUSCLE SPASM: ICD-10-CM

## 2019-04-23 PROCEDURE — 72040 X-RAY EXAM NECK SPINE 2-3 VW: CPT

## 2019-04-23 PROCEDURE — 99283 EMERGENCY DEPT VISIT LOW MDM: CPT

## 2019-04-23 RX ORDER — ACETAMINOPHEN 325 MG/1
650 TABLET ORAL EVERY 6 HOURS PRN
Status: DISCONTINUED | OUTPATIENT
Start: 2019-04-23 | End: 2019-04-23 | Stop reason: HOSPADM

## 2019-04-23 RX ORDER — CYCLOBENZAPRINE HCL 5 MG
5 TABLET ORAL 3 TIMES DAILY PRN
Qty: 9 TABLET | Refills: 0 | Status: SHIPPED | OUTPATIENT
Start: 2019-04-23 | End: 2019-04-26

## 2019-04-23 RX ADMIN — ACETAMINOPHEN 650 MG: 325 TABLET, FILM COATED ORAL at 15:47

## 2019-05-09 ENCOUNTER — OFFICE VISIT (OUTPATIENT)
Dept: INTERNAL MEDICINE | Facility: CLINIC | Age: 49
End: 2019-05-09

## 2019-05-09 ENCOUNTER — HOSPITAL ENCOUNTER (OUTPATIENT)
Dept: GENERAL RADIOLOGY | Facility: HOSPITAL | Age: 49
Discharge: HOME | End: 2019-05-09
Admitting: PHYSICIAN ASSISTANT

## 2019-05-09 VITALS
SYSTOLIC BLOOD PRESSURE: 120 MMHG | WEIGHT: 197 LBS | BODY MASS INDEX: 36.25 KG/M2 | RESPIRATION RATE: 16 BRPM | HEART RATE: 71 BPM | TEMPERATURE: 98.2 F | HEIGHT: 62 IN | OXYGEN SATURATION: 98 % | DIASTOLIC BLOOD PRESSURE: 78 MMHG

## 2019-05-09 DIAGNOSIS — M54.6 ACUTE LEFT-SIDED THORACIC BACK PAIN: ICD-10-CM

## 2019-05-09 DIAGNOSIS — S46.812D STRAIN OF LEFT TRAPEZIUS MUSCLE, SUBSEQUENT ENCOUNTER: ICD-10-CM

## 2019-05-09 DIAGNOSIS — M54.50 ACUTE LEFT-SIDED LOW BACK PAIN WITHOUT SCIATICA: ICD-10-CM

## 2019-05-09 DIAGNOSIS — M54.2 CERVICALGIA: ICD-10-CM
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
## 2019-05-09 DIAGNOSIS — G44.209 TENSION HEADACHE: ICD-10-CM

## 2019-05-09 PROCEDURE — 72110 X-RAY EXAM L-2 SPINE 4/>VWS: CPT

## 2019-05-09 PROCEDURE — 72072 X-RAY EXAM THORAC SPINE 3VWS: CPT

## 2019-05-09 PROCEDURE — 99214 OFFICE O/P EST MOD 30 MIN: CPT | Performed by: PHYSICIAN ASSISTANT

## 2019-05-09 RX ORDER — CYCLOBENZAPRINE HCL 10 MG
10 TABLET ORAL 3 TIMES DAILY PRN
Qty: 30 TABLET | Refills: 0 | OUTPATIENT
Start: 2019-05-09 | End: 2020-01-06
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
## 2019-05-30 ENCOUNTER — OFFICE VISIT (OUTPATIENT)
Dept: INTERNAL MEDICINE | Facility: CLINIC | Age: 49
End: 2019-05-30

## 2019-05-30 VITALS
BODY MASS INDEX: 36.07 KG/M2 | TEMPERATURE: 98.6 F | OXYGEN SATURATION: 99 % | SYSTOLIC BLOOD PRESSURE: 110 MMHG | HEART RATE: 98 BPM | DIASTOLIC BLOOD PRESSURE: 72 MMHG | WEIGHT: 196 LBS | HEIGHT: 62 IN

## 2019-05-30 DIAGNOSIS — M54.2 CERVICALGIA: ICD-10-CM
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
## 2019-05-30 DIAGNOSIS — M54.42 ACUTE LEFT-SIDED LOW BACK PAIN WITH LEFT-SIDED SCIATICA: ICD-10-CM

## 2019-05-30 DIAGNOSIS — G44.209 TENSION HEADACHE: ICD-10-CM

## 2019-05-30 DIAGNOSIS — M54.9 UPPER BACK PAIN ON LEFT SIDE: ICD-10-CM

## 2019-05-30 PROCEDURE — 99214 OFFICE O/P EST MOD 30 MIN: CPT | Performed by: PHYSICIAN ASSISTANT
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
## 2019-06-19 ENCOUNTER — HOSPITAL ENCOUNTER (OUTPATIENT)
Dept: MRI IMAGING | Facility: HOSPITAL | Age: 49
Discharge: HOME | End: 2019-06-19
Admitting: PHYSICIAN ASSISTANT

## 2019-06-19 ENCOUNTER — HOSPITAL ENCOUNTER (OUTPATIENT)
Dept: MRI IMAGING | Facility: HOSPITAL | Age: 49
Discharge: HOME | End: 2019-06-19

## 2019-06-19 PROCEDURE — 72141 MRI NECK SPINE W/O DYE: CPT

## 2019-06-19 PROCEDURE — 72148 MRI LUMBAR SPINE W/O DYE: CPT
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
## 2019-06-20 DIAGNOSIS — M54.2 CERVICALGIA: ICD-10-CM

## 2019-06-20 DIAGNOSIS — M50.20 CERVICAL DISC HERNIATION: ICD-10-CM

## 2019-06-20 DIAGNOSIS — G44.209 TENSION HEADACHE: ICD-10-CM

## 2019-06-20 DIAGNOSIS — M54.2 NECK PAIN ON LEFT SIDE: ICD-10-CM

## 2019-06-20 DIAGNOSIS — M50.30 BULGING OF CERVICAL INTERVERTEBRAL DISC: ICD-10-CM

## 2019-06-25 ENCOUNTER — TRANSCRIBE ORDERS (OUTPATIENT)
Dept: MAMMOGRAPHY | Facility: HOSPITAL | Age: 49
End: 2019-06-25

## 2019-06-25 DIAGNOSIS — Z12.39 BREAST CANCER SCREENING: Primary | ICD-10-CM

## 2019-07-08 ENCOUNTER — TREATMENT (OUTPATIENT)
Dept: PHYSICAL THERAPY | Facility: CLINIC | Age: 49
End: 2019-07-08

## 2019-07-08 DIAGNOSIS — M25.512 ACUTE PAIN OF LEFT SHOULDER: ICD-10-CM

## 2019-07-08 DIAGNOSIS — M54.12 RADICULOPATHY, CERVICAL: Primary | ICD-10-CM

## 2019-07-08 DIAGNOSIS — M54.50 ACUTE BILATERAL LOW BACK PAIN WITHOUT SCIATICA: ICD-10-CM

## 2019-07-08 PROCEDURE — 97110 THERAPEUTIC EXERCISES: CPT | Performed by: PHYSICAL THERAPIST

## 2019-07-08 PROCEDURE — 97161 PT EVAL LOW COMPLEX 20 MIN: CPT | Performed by: PHYSICAL THERAPIST
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
## 2019-07-15 ENCOUNTER — TREATMENT (OUTPATIENT)
Dept: PHYSICAL THERAPY | Facility: CLINIC | Age: 49
End: 2019-07-15

## 2019-07-15 DIAGNOSIS — M54.50 ACUTE BILATERAL LOW BACK PAIN WITHOUT SCIATICA: ICD-10-CM

## 2019-07-15 DIAGNOSIS — M54.12 RADICULOPATHY, CERVICAL: Primary | ICD-10-CM

## 2019-07-15 DIAGNOSIS — M25.512 ACUTE PAIN OF LEFT SHOULDER: ICD-10-CM

## 2019-07-15 PROCEDURE — 97014 ELECTRIC STIMULATION THERAPY: CPT | Performed by: PHYSICAL THERAPIST

## 2019-07-15 PROCEDURE — 97110 THERAPEUTIC EXERCISES: CPT | Performed by: PHYSICAL THERAPIST

## 2019-07-15 NOTE — PROGRESS NOTES
Physical Therapy Daily Progress Note      Visit # : 2    Arnol Fritz reports 2-3/10 pain today at rest.  Pt reports a lot of stiffness in the L shoulder and the low back.  L lumbar spine tightness and pain.          Objective Pt presents to PT today with minimal distress noted today.      Pt needed VC's for scapular retraction position.      AROM:  L shoulder supine flexion 129 degrees.     Palpation:  Trial of myofascial release today with no success due to MM guarding and pain.        See Exercise, Manual, and Modality Logs for complete treatment.     Assessment/Plan  Pt with MM guarding still very limiting to manual therapy.  She did seem to be able to do more exercise today.  Check response to the ES upon next visit.       Progress per Plan of Care  Check response to ES and HEP.            Manual Therapy:         mins  90437;  Therapeutic Exercise:    40     mins  23077;     Neuromuscular Ephraim:        mins  95678;    Therapeutic Activity:          mins  52953;     Gait Training:        ___  mins  47418;     Ultrasound:          mins  11086;    Electrical Stimulation:    15     mins  48447 ( );  Dry Needling          mins self-pay    Timed Treatment:   40   mins   Total Treatment:     58   mins    Bhanu Martinez, PT  Physical Therapist

## 2019-07-16 ENCOUNTER — OFFICE VISIT (OUTPATIENT)
Dept: NEUROSURGERY | Facility: CLINIC | Age: 49
End: 2019-07-16

## 2019-07-16 VITALS — BODY MASS INDEX: 36.07 KG/M2 | WEIGHT: 196 LBS | HEIGHT: 62 IN

## 2019-07-16 DIAGNOSIS — S13.9XXA CERVICAL SPRAIN, INITIAL ENCOUNTER: ICD-10-CM

## 2019-07-16 DIAGNOSIS — S33.5XXA LUMBAR SPRAIN, INITIAL ENCOUNTER: Primary | ICD-10-CM

## 2019-07-16 PROCEDURE — 99243 OFF/OP CNSLTJ NEW/EST LOW 30: CPT | Performed by: NEUROLOGICAL SURGERY
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
## 2019-07-19 ENCOUNTER — TREATMENT (OUTPATIENT)
Dept: PHYSICAL THERAPY | Facility: CLINIC | Age: 49
End: 2019-07-19

## 2019-07-19 DIAGNOSIS — M54.50 ACUTE BILATERAL LOW BACK PAIN WITHOUT SCIATICA: ICD-10-CM

## 2019-07-19 DIAGNOSIS — M54.12 RADICULOPATHY, CERVICAL: Primary | ICD-10-CM

## 2019-07-19 DIAGNOSIS — M25.512 ACUTE PAIN OF LEFT SHOULDER: ICD-10-CM

## 2019-07-19 PROCEDURE — 97140 MANUAL THERAPY 1/> REGIONS: CPT | Performed by: PHYSICAL THERAPIST

## 2019-07-19 PROCEDURE — 97110 THERAPEUTIC EXERCISES: CPT | Performed by: PHYSICAL THERAPIST

## 2019-07-19 PROCEDURE — 97014 ELECTRIC STIMULATION THERAPY: CPT | Performed by: PHYSICAL THERAPIST

## 2019-07-19 NOTE — PROGRESS NOTES
Physical Therapy Daily Progress Note      Visit # : 3    Arnol Fritz reports 1/10 pain today at rest.  Neck and shoulders feel better.  The ES seemed to relaxer her more.          Objective Pt presents to PT today with no distress noted.    Pt with improved mobility of the C/S and shoulders.    Palpation: L bicep tendon very tender and guarded.    Shoulder flexion and ROM is the most limited action.     See Exercise, Manual, and Modality Logs for complete treatment.     Assessment/Plan  Pt with improved neck and T/S mobility.  She has much less noted pain as well.         Progress per Plan of Care             Manual Therapy:    9     mins  80846;  Therapeutic Exercise:    38     mins  44215;     Neuromuscular Ephraim:        mins  82000;    Therapeutic Activity:          mins  37672;     Gait Training:        ___  mins  89745;     Ultrasound:          mins  80452;    Electrical Stimulation:    15     mins  30116 ( );  Dry Needling          mins self-pay    Timed Treatment:   47   mins   Total Treatment:     67   mins    Bhanu Martinez, PT  Physical Therapist

## 2019-07-22 ENCOUNTER — TREATMENT (OUTPATIENT)
Dept: PHYSICAL THERAPY | Facility: CLINIC | Age: 49
End: 2019-07-22

## 2019-07-22 DIAGNOSIS — M54.50 ACUTE BILATERAL LOW BACK PAIN WITHOUT SCIATICA: ICD-10-CM

## 2019-07-22 DIAGNOSIS — M25.512 ACUTE PAIN OF LEFT SHOULDER: ICD-10-CM

## 2019-07-22 DIAGNOSIS — M54.12 RADICULOPATHY, CERVICAL: Primary | ICD-10-CM

## 2019-07-22 PROCEDURE — 97110 THERAPEUTIC EXERCISES: CPT | Performed by: PHYSICAL THERAPIST

## 2019-07-26 ENCOUNTER — TREATMENT (OUTPATIENT)
Dept: PHYSICAL THERAPY | Facility: CLINIC | Age: 49
End: 2019-07-26

## 2019-07-26 DIAGNOSIS — M25.512 ACUTE PAIN OF LEFT SHOULDER: ICD-10-CM

## 2019-07-26 DIAGNOSIS — M54.50 ACUTE BILATERAL LOW BACK PAIN WITHOUT SCIATICA: ICD-10-CM

## 2019-07-26 DIAGNOSIS — M54.12 RADICULOPATHY, CERVICAL: Primary | ICD-10-CM

## 2019-07-26 PROCEDURE — 97140 MANUAL THERAPY 1/> REGIONS: CPT | Performed by: PHYSICAL THERAPIST

## 2019-07-26 PROCEDURE — 97110 THERAPEUTIC EXERCISES: CPT | Performed by: PHYSICAL THERAPIST

## 2019-07-26 NOTE — PROGRESS NOTES
Physical Therapy Daily Progress Note      Visit # : 5    Arnol Fritz reports 3/10 pain today at rest.  Pt reports elevated stress this week at work.  Pt with pain more in the UT and neck.  She reports the low back and mid back seems to be better from the new exercises.         Objective Pt presents to PT today with only slight guarding noted with activity.     Pt with B C/T junction and upper T/S HVLAT without elevated pain.    Palpation:  L Levator and L UT.        See Exercise, Manual, and Modality Logs for complete treatment.     Assessment/Plan  Pt with overall improved function and mobility.  She has more localized pain in the L UT and L Levator MM.         Progress per Plan of Care  Check response to the DN and the HVLAT.            Manual Therapy:    16     mins  85419;  Therapeutic Exercise:    40     mins  04446;     Neuromuscular Ephraim:        mins  10440;    Therapeutic Activity:          mins  70167;     Gait Training:        ___  mins  38591;     Ultrasound:          mins  94792;    Electrical Stimulation:         mins  48723 ( );  Dry Needling          mins self-pay    Timed Treatment:   56   mins   Total Treatment:     56   mins    Bahnu Martinez, PT  Physical Therapist

## 2019-07-29 ENCOUNTER — TREATMENT (OUTPATIENT)
Dept: PHYSICAL THERAPY | Facility: CLINIC | Age: 49
End: 2019-07-29

## 2019-07-29 DIAGNOSIS — M54.12 RADICULOPATHY, CERVICAL: Primary | ICD-10-CM

## 2019-07-29 DIAGNOSIS — M54.50 ACUTE BILATERAL LOW BACK PAIN WITHOUT SCIATICA: ICD-10-CM

## 2019-07-29 DIAGNOSIS — M25.512 ACUTE PAIN OF LEFT SHOULDER: ICD-10-CM

## 2019-07-29 PROCEDURE — 97140 MANUAL THERAPY 1/> REGIONS: CPT | Performed by: PHYSICAL THERAPIST

## 2019-07-29 PROCEDURE — 97110 THERAPEUTIC EXERCISES: CPT | Performed by: PHYSICAL THERAPIST

## 2019-07-29 NOTE — PROGRESS NOTES
Physical Therapy Daily Progress Note      Visit # : 6    Arnol Fritz reports 2/10 pain today at rest.  Pt reports she feels more stuff in the L UT area today.          Objective Pt presents to PT today with no distress noted at rest.     Pt with improved function and less pain.     Pt with improved T/S mobility and C/S mobility with less HVLAT noted.    Palpation:  L mid trap and Mid T/S guarded and painful.      See Exercise, Manual, and Modality Logs for complete treatment.     Assessment/Plan  Pt with good response to the T/S mobs and the HVLAT.  It is uncertain if the DN seemed to have any affect on her today.       Progress per Plan of Care  Continue with PT for mobility.            Manual Therapy:    12     mins  14464;  Therapeutic Exercise:    42     mins  04849;     Neuromuscular Ephraim:        mins  99118;    Therapeutic Activity:          mins  96756;     Gait Training:        ___  mins  76745;     Ultrasound:          mins  55949;    Electrical Stimulation:         mins  59856 ( );  Dry Needling          mins self-pay    Timed Treatment:   54   mins   Total Treatment:     54   mins    Bhanu Martinez, PT  Physical Therapist

## 2019-08-19 ENCOUNTER — TREATMENT (OUTPATIENT)
Dept: PHYSICAL THERAPY | Facility: CLINIC | Age: 49
End: 2019-08-19

## 2019-08-19 DIAGNOSIS — M54.12 RADICULOPATHY, CERVICAL: Primary | ICD-10-CM

## 2019-08-19 DIAGNOSIS — M54.50 ACUTE BILATERAL LOW BACK PAIN WITHOUT SCIATICA: ICD-10-CM

## 2019-08-19 PROCEDURE — 97140 MANUAL THERAPY 1/> REGIONS: CPT | Performed by: PHYSICAL THERAPIST

## 2019-08-19 PROCEDURE — 97110 THERAPEUTIC EXERCISES: CPT | Performed by: PHYSICAL THERAPIST

## 2019-08-19 NOTE — PROGRESS NOTES
Physical Therapy Daily Progress Note      Visit # : 7    Arnol Fritz reports 4/10 pain today at rest.  Pt with pain in the L shoulder and neck.  It is consistently tight.          Objective Pt presents to PT today with slight distress.     L UT and L levator scapula MM tenderness noted.    hypomobility noted in the Upper T/S with testing.      See Exercise, Manual, and Modality Logs for complete treatment.     Assessment/Plan  Pt with hypomobility in the upper T/S and the lower C/S.  We attempted more mobs and stretching today with soreness noted immediately.        Progress strengthening /stabilization /functional activity  Check response to elevated Mobs and manual therapy.            Manual Therapy:    24     mins  60738;  Therapeutic Exercise:    26     mins  57006;     Neuromuscular Ephraim:        mins  39821;    Therapeutic Activity:          mins  36902;     Gait Training:        ___  mins  82172;     Ultrasound:          mins  19780;    Electrical Stimulation:         mins  22802 ( );  Dry Needling          mins self-pay    Timed Treatment:   50   mins   Total Treatment:     50   mins    Bhanu Martinez, PT  Physical Therapist

## 2019-08-21 ENCOUNTER — HOSPITAL ENCOUNTER (OUTPATIENT)
Dept: MAMMOGRAPHY | Facility: HOSPITAL | Age: 49
Discharge: HOME | End: 2019-08-21
Admitting: PHYSICIAN ASSISTANT

## 2019-08-21 DIAGNOSIS — Z12.39 BREAST CANCER SCREENING: ICD-10-CM

## 2019-08-21 PROCEDURE — 77067 SCR MAMMO BI INCL CAD: CPT

## 2019-08-21 PROCEDURE — 77063 BREAST TOMOSYNTHESIS BI: CPT

## 2019-08-23 ENCOUNTER — TREATMENT (OUTPATIENT)
Dept: PHYSICAL THERAPY | Facility: CLINIC | Age: 49
End: 2019-08-23

## 2019-08-23 DIAGNOSIS — M54.50 ACUTE BILATERAL LOW BACK PAIN WITHOUT SCIATICA: ICD-10-CM

## 2019-08-23 DIAGNOSIS — M54.12 RADICULOPATHY, CERVICAL: Primary | ICD-10-CM

## 2019-08-23 DIAGNOSIS — M25.512 ACUTE PAIN OF LEFT SHOULDER: ICD-10-CM

## 2019-08-23 PROCEDURE — 97140 MANUAL THERAPY 1/> REGIONS: CPT | Performed by: PHYSICAL THERAPIST

## 2019-08-23 PROCEDURE — 97014 ELECTRIC STIMULATION THERAPY: CPT | Performed by: PHYSICAL THERAPIST

## 2019-08-23 PROCEDURE — 97110 THERAPEUTIC EXERCISES: CPT | Performed by: PHYSICAL THERAPIST

## 2019-08-23 NOTE — PROGRESS NOTES
Physical Therapy Daily Progress Note      Visit # : 8    Arnol Fritz reports 5/10 pain today at rest.  Low back and hip.         Objective Pt presents to PT today with minimal distress noted with resting and with activity.     Palpation: L pec minor tender, L UT tenderness but the tonicity is normal.      PIVM: C/S  L SB tightness noted on the ipsilateral side.        See Exercise, Manual, and Modality Logs for complete treatment.     Assessment/Plan  Pt with improved overall status but she still has soreness noted throughout.  She has L SB tightness and MM guarding noted.       Progress strengthening /stabilization /functional activity            Manual Therapy:    14     mins  19441;  Therapeutic Exercise:    39     mins  03958;     Neuromuscular Ephraim:        mins  80061;    Therapeutic Activity:          mins  04780;     Gait Training:        ___  mins  23985;     Ultrasound:          mins  45916;    Electrical Stimulation:    15     mins  15888 ( );  Dry Needling          mins self-pay    Timed Treatment:   53   mins   Total Treatment:     72   mins    Bhanu Martinez, PT  Physical Therapist

## 2019-08-29 ENCOUNTER — TREATMENT (OUTPATIENT)
Dept: PHYSICAL THERAPY | Facility: CLINIC | Age: 49
End: 2019-08-29

## 2019-08-29 DIAGNOSIS — M54.12 RADICULOPATHY, CERVICAL: Primary | ICD-10-CM

## 2019-08-29 DIAGNOSIS — M25.512 ACUTE PAIN OF LEFT SHOULDER: ICD-10-CM

## 2019-08-29 DIAGNOSIS — M54.50 ACUTE BILATERAL LOW BACK PAIN WITHOUT SCIATICA: ICD-10-CM

## 2019-08-29 PROCEDURE — 97110 THERAPEUTIC EXERCISES: CPT | Performed by: PHYSICAL THERAPIST

## 2019-08-29 PROCEDURE — 97140 MANUAL THERAPY 1/> REGIONS: CPT | Performed by: PHYSICAL THERAPIST

## 2019-08-29 NOTE — PROGRESS NOTES
Physical Therapy Daily Progress Note      Visit # : 9    Arnol Fritz reports 1/10 pain today at rest.  Pt reports she does not sleep well at night.  Stiffness in the neck and the L shoulder.         Objective Pt presents to PT today with minimal distress noted.     Post stretching and mobilizations 4/10 pain noted.     T/S L greater than R for moderate guarding and hypomobility.    See Exercise, Manual, and Modality Logs for complete treatment.     Assessment/Plan  Pt with elevated pain from increased activity.  Pt with hypomobility and guarding minimal in the T/S.       Progress per Plan of Care  Continue after 2 weeks out of town.            Manual Therapy:    16     mins  58741;  Therapeutic Exercise:    39     mins  65117;     Neuromuscular Ephraim:        mins  70786;    Therapeutic Activity:          mins  00379;     Gait Training:        ___  mins  88740;     Ultrasound:          mins  37025;    Electrical Stimulation:         mins  70147 ( );  Dry Needling          mins self-pay    Timed Treatment:   55   mins   Total Treatment:     55   mins    Bhanu Martinez, PT  Physical Therapist

## 2019-12-04 NOTE — PROGRESS NOTES
How Severe Is Your Skin Lesion?: mild Physical Therapy Daily Progress Note      Visit # : 4    Arnol Fritz reports 2-3/10 pain today at rest.  Pt reports the shoulder is feeling a little better.  She feels most of her sxs in the mid back and L posterior shoulder today.         Objective Pt presents to PT today with no distress.     Pt with improved C/S and shoulder sxs and ROM.      Pt with good response to lumbar spine stretching.  Prone over T-Ball + for relief of sxs.        See Exercise, Manual, and Modality Logs for complete treatment.     Assessment/Plan  Pt with improved function and less pain noted.  Pain is more located in the lumbar spine this morning.        Progress per Plan of Care             Manual Therapy:         mins  29614;  Therapeutic Exercise:    54     mins  20897;     Neuromuscular Ephraim:        mins  58432;    Therapeutic Activity:          mins  44897;     Gait Training:        ___  mins  60255;     Ultrasound:          mins  55176;    Electrical Stimulation:         mins  03691 ( );  Dry Needling          mins self-pay    Timed Treatment:   54   mins   Total Treatment:     54   mins    Bhanu Martinez, PT  Physical Therapist         Have Your Skin Lesions Been Treated?: not been treated Is This A New Presentation, Or A Follow-Up?: Skin Lesions

## 2020-01-06 ENCOUNTER — TELEPHONE (OUTPATIENT)
Dept: URGENT CARE | Facility: CLINIC | Age: 50
End: 2020-01-06

## 2020-01-06 NOTE — TELEPHONE ENCOUNTER
----- Message from Jill Zamorano MD sent at 1/6/2020  1:40 PM EST -----  Radiologist confirmed pneumonia in left lung and recommended repeat CXR  Recommend repeat CXR in 4-6 weeks as discussed

## 2020-02-07 DIAGNOSIS — R91.8 OPACITY OF LUNG ON IMAGING STUDY: Primary | ICD-10-CM

## 2020-03-03 ENCOUNTER — HOSPITAL ENCOUNTER (OUTPATIENT)
Dept: GENERAL RADIOLOGY | Facility: HOSPITAL | Age: 50
Discharge: HOME | End: 2020-03-03
Admitting: PHYSICIAN ASSISTANT

## 2020-03-03 PROCEDURE — 71046 X-RAY EXAM CHEST 2 VIEWS: CPT

## 2020-05-13 ENCOUNTER — OFFICE VISIT (OUTPATIENT)
Dept: OBSTETRICS AND GYNECOLOGY | Facility: CLINIC | Age: 50
End: 2020-05-13

## 2020-05-13 VITALS
BODY MASS INDEX: 35.54 KG/M2 | HEIGHT: 63 IN | WEIGHT: 200.6 LBS | DIASTOLIC BLOOD PRESSURE: 80 MMHG | SYSTOLIC BLOOD PRESSURE: 120 MMHG

## 2020-05-13 DIAGNOSIS — N90.7 VULVAR CYST: Primary | ICD-10-CM

## 2020-05-13 PROCEDURE — 99212 OFFICE O/P EST SF 10 MIN: CPT | Performed by: PHYSICIAN ASSISTANT

## 2020-05-13 NOTE — PROGRESS NOTES
Subjective   Chief Complaint   Patient presents with   • Vaginal Lesion     Patient states that she has a spot on her vagina that could possibly be a cyst.       Arnol Fritz is a 49 y.o. year old  presenting to be seen for vulvar lesion she thinks is a cyst.  She reports this has been coming and going since March and at this time it is very small.  She is noticed it seems to flareup with her menses and then go away.  It has drained some blood on occasion.  At this time it feels very small and is not painful or tender.    Past Medical History:   Diagnosis Date   • Acid reflux    • Anemia    • Headache    • History of Papanicolaou smear of cervix 2016    WNL   • Injury of back    • Injury of neck         Current Outpatient Medications:   •  albuterol sulfate  (90 Base) MCG/ACT inhaler, Inhale 2 puffs Every 4 (Four) Hours As Needed for Wheezing., Disp: 1 inhaler, Rfl: 0  •  omeprazole (priLOSEC) 20 MG capsule, 20 mg Daily., Disp: , Rfl:   •  spironolactone (ALDACTONE) 50 MG tablet, Take 100 mg by mouth 2 (Two) Times a Day., Disp: , Rfl:    Allergies   Allergen Reactions   • Sulfa Antibiotics Anaphylaxis     Joint swelling, and closing of airway   • Solodyn [Minocycline Hcl Er] Other (See Comments)     Joint swelling      Past Surgical History:   Procedure Laterality Date   • DILATATION AND CURETTAGE     • TUBAL ABDOMINAL LIGATION     • WISDOM TOOTH EXTRACTION        Social History     Socioeconomic History   • Marital status:      Spouse name: Not on file   • Number of children: Not on file   • Years of education: Not on file   • Highest education level: Not on file   Tobacco Use   • Smoking status: Never Smoker   • Smokeless tobacco: Never Used   Substance and Sexual Activity   • Alcohol use: No   • Drug use: No   • Sexual activity: Yes     Birth control/protection: Surgical     Comment: Tubal      Family History   Problem Relation Age of Onset   • Thyroid disease Mother    • Breast cancer  "Mother    • Arthritis Father    • Cancer Father    • Breast cancer Maternal Grandmother    • Breast cancer Maternal Aunt    • Ovarian cancer Neg Hx        Review of Systems   Constitutional: Negative for chills, diaphoresis and fever.   Gastrointestinal: Negative for abdominal pain, diarrhea, nausea and vomiting.   Genitourinary: Negative for difficulty urinating, dysuria, frequency, menstrual problem, pelvic pain and vaginal discharge.   Musculoskeletal: Negative.            Objective   /80   Ht 160 cm (63\")   Wt 91 kg (200 lb 9.6 oz)   LMP 04/29/2020   Breastfeeding No   BMI 35.53 kg/m²     Physical Exam   Constitutional: She appears well-developed and well-nourished. She is cooperative. No distress.   Eyes: Conjunctivae, EOM and lids are normal.   Genitourinary:         Genitourinary Comments: Very small pea size cyst left labia minora. Nontender, no erythema   Neurological: She is alert.   Skin: Skin is warm and dry. No rash noted.   Psychiatric: She has a normal mood and affect. Her behavior is normal. Thought content normal.            Assessment and Plan  Arnol was seen today for vaginal lesion.    Diagnoses and all orders for this visit:    Vulvar cyst      Patient Instructions   Recommend conservative measures at this time.  Patient is advised to do warm soaks or warm moist compresses if the cyst starts to enlarge or become painful.  She is encouraged to wear loose fitting clothes that are nonrestrictive.  If the area were to become very enlarged or painful would consider incision and drainage in the office.             This note was electronically signed.    Shahida Berman PA-C   May 13, 2020  "

## 2020-05-13 NOTE — PATIENT INSTRUCTIONS
Recommend conservative measures at this time.  Patient is advised to do warm soaks or warm moist compresses if the cyst starts to enlarge or become painful.  She is encouraged to wear loose fitting clothes that are nonrestrictive.  If the area were to become very enlarged or painful would consider incision and drainage in the office.

## 2021-02-05 ENCOUNTER — LAB (OUTPATIENT)
Dept: LAB | Facility: HOSPITAL | Age: 51
End: 2021-02-05

## 2021-02-05 ENCOUNTER — TELEMEDICINE (OUTPATIENT)
Dept: INTERNAL MEDICINE | Facility: CLINIC | Age: 51
End: 2021-02-05

## 2021-02-05 VITALS — TEMPERATURE: 100.2 F

## 2021-02-05 DIAGNOSIS — J06.9 ACUTE URI: ICD-10-CM

## 2021-02-05 DIAGNOSIS — Z20.822 SUSPECTED COVID-19 VIRUS INFECTION: ICD-10-CM

## 2021-02-05 DIAGNOSIS — J06.9 ACUTE URI: Primary | ICD-10-CM

## 2021-02-05 PROCEDURE — U0004 COV-19 TEST NON-CDC HGH THRU: HCPCS

## 2021-02-05 PROCEDURE — 99213 OFFICE O/P EST LOW 20 MIN: CPT | Performed by: PHYSICIAN ASSISTANT

## 2021-02-05 PROCEDURE — C9803 HOPD COVID-19 SPEC COLLECT: HCPCS

## 2021-02-05 RX ORDER — AMOXICILLIN 875 MG/1
875 TABLET, COATED ORAL 2 TIMES DAILY
Qty: 20 TABLET | Refills: 0 | Status: SHIPPED | OUTPATIENT
Start: 2021-02-05 | End: 2021-10-15

## 2021-02-05 RX ORDER — FLUCONAZOLE 150 MG/1
150 TABLET ORAL ONCE
Qty: 1 TABLET | Refills: 0 | Status: SHIPPED | OUTPATIENT
Start: 2021-02-05 | End: 2021-02-05

## 2021-02-05 NOTE — PROGRESS NOTES
You have chosen to receive care through a telehealth visit.  Do you consent to use a video/audio connection for your medical care today? Yes  Active parties: Meghann Montoya PA-C, Raven Pate CMA, Arnol Fritz    Chief Complaint  URI (fever, cough, body aches, can't sleep, congestion, sore throat)    Subjective          Arnol Fritz presents to [facility] for URI symptoms.    History of Present Illness  Patient presents today via video visit with concern of acute onset sore throat yesterday morning followed by onset of fever which have both worsened today.  Today she has also developed nasal congestion, headaches, body aches and cough.  She denies any SOA or loss of taste or smell.  T-max has been 100.6 F.  She has been taking Motrin and Tylenol Cold which helps some until they wear off.  She denies any known exposure to COVID-19. She has been working from home and reports that she has not been away from her home in 3 to 4 weeks, however her  does work outside of the home.        Objective   Vital Signs:   Temp 100.2 °F (37.9 °C)     Physical Exam  Vitals signs and nursing note reviewed.   Constitutional:       General: She is not in acute distress.     Appearance: She is well-developed. She is obese. She is ill-appearing (appears fatigued). She is not toxic-appearing or diaphoretic.   HENT:      Head: Normocephalic and atraumatic.      Right Ear: External ear normal.      Left Ear: External ear normal.   Eyes:      General: No scleral icterus.     Extraocular Movements: Extraocular movements intact.   Neck:      Musculoskeletal: Normal range of motion.   Pulmonary:      Effort: Pulmonary effort is normal. No respiratory distress.   Lymphadenopathy:      Cervical: Cervical adenopathy (bilateral on exam performed by patient as directed by provider) present.   Skin:     Coloration: Skin is not pale.      Findings: No erythema or rash.   Neurological:      Mental Status: She is  alert and oriented to person, place, and time.      Coordination: Coordination normal.   Psychiatric:         Mood and Affect: Mood normal.         Behavior: Behavior normal.         Thought Content: Thought content normal.         Judgment: Judgment normal.              Assessment and Plan    Problem List Items Addressed This Visit     None      Visit Diagnoses     Acute URI    -  Primary    Relevant Medications    Begin for treatment of URI possible pharyngitis: Amoxicillin (AMOXIL) 875 MG tablet    May continue Motrin and Tylenol cold as needed for symptom.  Increase p.o. fluid intake.    Other Relevant Orders    COVID-19 PCR, [facility], NP SWAB IN [facility]SPORT MEDIA 24-30 HR TAT - Swab, Nasopharynx    QUESTIONNAIRE SERIES (Completed)    Suspected COVID-19 virus infection        Relevant Orders    COVID-19 PCR, [facility], NP SWAB IN [facility] TRANSPORT MEDIA 24-30 HR TAT - Swab, Nasopharynx    QUESTIONNAIRE SERIES (Completed)      Quarantine at home for at least 10 days from symptom onset AND until symptoms have improved overall AND fever free for 48 hours without use of fever reducing medications.         I spent 20 minutes caring for Arnol on this date of service. This time includes time spent by me in the following activities:preparing for the visit, obtaining and/or reviewing a separately obtained history, performing a medically appropriate examination and/or evaluation , counseling and educating the patient/family/caregiver, ordering medications, tests, or procedures and documenting information in the medical record  Follow Up   Return for Annual physical.  Patient was given instructions and counseling regarding her condition or for health maintenance advice. Please see specific information pulled into the AVS if appropriate.

## 2021-02-06 LAB — SARS-COV-2 RNA RESP QL NAA+PROBE: NOT DETECTED

## 2021-03-25 ENCOUNTER — APPOINTMENT (OUTPATIENT)
Dept: VACCINE CLINIC | Facility: HOSPITAL | Age: 51
End: 2021-03-25

## 2021-03-25 ENCOUNTER — TRANSCRIBE ORDERS (OUTPATIENT)
Dept: ADMINISTRATIVE | Facility: HOSPITAL | Age: 51
End: 2021-03-25

## 2021-03-25 DIAGNOSIS — Z12.31 VISIT FOR SCREENING MAMMOGRAM: Primary | ICD-10-CM

## 2021-04-29 ENCOUNTER — HOSPITAL ENCOUNTER (OUTPATIENT)
Dept: MAMMOGRAPHY | Facility: HOSPITAL | Age: 51
Discharge: HOME | End: 2021-04-29
Admitting: PHYSICIAN ASSISTANT

## 2021-04-29 DIAGNOSIS — Z12.31 VISIT FOR SCREENING MAMMOGRAM: ICD-10-CM

## 2021-04-29 PROCEDURE — 77063 BREAST TOMOSYNTHESIS BI: CPT

## 2021-04-29 PROCEDURE — 77067 SCR MAMMO BI INCL CAD: CPT

## 2021-10-15 ENCOUNTER — TELEMEDICINE (OUTPATIENT)
Dept: INTERNAL MEDICINE | Facility: CLINIC | Age: 51
End: 2021-10-15

## 2021-10-15 VITALS
HEIGHT: 63 IN | SYSTOLIC BLOOD PRESSURE: 124 MMHG | WEIGHT: 185 LBS | BODY MASS INDEX: 32.78 KG/M2 | TEMPERATURE: 100.1 F | HEART RATE: 73 BPM | DIASTOLIC BLOOD PRESSURE: 78 MMHG

## 2021-10-15 DIAGNOSIS — R05.9 COUGH: Primary | ICD-10-CM

## 2021-10-15 PROCEDURE — 99213 OFFICE O/P EST LOW 20 MIN: CPT | Performed by: INTERNAL MEDICINE

## 2021-10-15 RX ORDER — AMOXICILLIN AND CLAVULANATE POTASSIUM 500; 125 MG/1; MG/1
1 TABLET, FILM COATED ORAL 3 TIMES DAILY
Qty: 21 TABLET | Refills: 0 | Status: SHIPPED | OUTPATIENT
Start: 2021-10-15 | End: 2022-02-10

## 2021-10-15 RX ORDER — TRETINOIN 0.8 MG/G
GEL TOPICAL
COMMUNITY
Start: 2021-08-18

## 2021-10-15 NOTE — PROGRESS NOTES
"Subjective  Arnol Fritz is a 51 y.o. female    HPI complaints of cough and headache with fever denies significant shortness of breath.  She is immunized for Covid.  Does her work from home.  Denies any contact with a sick person.  Non-smoker has tried some OTC meds without much relief history of pneumonias in the past    The following portions of the patient's history were reviewed and updated as appropriate: allergies, current medications, past family history, past medical history, past social history, past surgical history, and problem list.     Review of Systems   Constitutional: Positive for fatigue and fever. Negative for activity change and appetite change.   HENT: Negative for congestion, ear discharge, ear pain and trouble swallowing.    Eyes: Negative for photophobia and visual disturbance.   Respiratory: Positive for cough and shortness of breath.    Cardiovascular: Negative for chest pain and palpitations.   Gastrointestinal: Negative for abdominal distention, abdominal pain, constipation, diarrhea, nausea and vomiting.   Endocrine: Negative.    Genitourinary: Negative for dysuria, hematuria and urgency.   Musculoskeletal: Positive for arthralgias and myalgias. Negative for back pain and joint swelling.   Skin: Negative for color change and rash.   Allergic/Immunologic: Negative.    Neurological: Negative for dizziness, weakness, light-headedness and headaches.   Hematological: Negative for adenopathy. Does not bruise/bleed easily.   Psychiatric/Behavioral: Negative for agitation, confusion and dysphoric mood. The patient is not nervous/anxious.        Visit Vitals  /78 Comment: patient reports   Pulse 73 Comment: patient reports   Temp 100.1 °F (37.8 °C) (Oral) Comment: patient reports - tylenol 30 min ago   Ht 160 cm (63\")   Wt 83.9 kg (185 lb) Comment: patient reports   BMI 32.77 kg/m²       Objective  Physical Exam  Vitals reviewed.   Constitutional:       General: She is not in acute " distress.     Appearance: She is well-developed and well-nourished.   Pulmonary:      Effort: Pulmonary effort is normal.   Psychiatric:         Mood and Affect: Mood and affect normal.       Physical Exam   Constitutional: She appears well-developed and well-nourished. No distress.   Pulmonary/Chest: Effort normal.   Psychiatric: She has a normal mood and affect.   Vitals reviewed.      Diagnoses and all orders for this visit:    Cough conservative measures.  Empiric antibiotic therapy.  Check for Covid  -     COVID-19 PCR, [facility], NP SWAB IN [facility] TRANSPORT MEDIA/ORAL SWISH 24-30 HR TAT - Swab, Nasopharynx; Future    Other orders  -     Retin-A Micro Pump 0.08 % gel

## 2021-10-18 ENCOUNTER — LAB (OUTPATIENT)
Dept: LAB | Facility: HOSPITAL | Age: 51
End: 2021-10-18

## 2021-10-18 DIAGNOSIS — R05.9 COUGH: ICD-10-CM

## 2021-10-18 LAB — SARS-COV-2 RNA NOSE QL NAA+PROBE: NOT DETECTED

## 2021-10-18 PROCEDURE — U0004 COV-19 TEST NON-CDC HGH THRU: HCPCS

## 2022-02-10 ENCOUNTER — TELEMEDICINE (OUTPATIENT)
Dept: INTERNAL MEDICINE | Facility: CLINIC | Age: 52
End: 2022-02-10

## 2022-02-10 DIAGNOSIS — J01.00 ACUTE NON-RECURRENT MAXILLARY SINUSITIS: ICD-10-CM

## 2022-02-10 DIAGNOSIS — U07.1 COVID-19: Primary | ICD-10-CM

## 2022-02-10 PROCEDURE — 99213 OFFICE O/P EST LOW 20 MIN: CPT | Performed by: NURSE PRACTITIONER

## 2022-02-10 RX ORDER — DEXTROMETHORPHAN HYDROBROMIDE AND PROMETHAZINE HYDROCHLORIDE 15; 6.25 MG/5ML; MG/5ML
5 SYRUP ORAL NIGHTLY PRN
Qty: 118 ML | Refills: 0 | Status: SHIPPED | OUTPATIENT
Start: 2022-02-10 | End: 2023-02-23

## 2022-02-10 RX ORDER — AMOXICILLIN AND CLAVULANATE POTASSIUM 875; 125 MG/1; MG/1
1 TABLET, FILM COATED ORAL 2 TIMES DAILY
Qty: 14 TABLET | Refills: 0 | Status: SHIPPED | OUTPATIENT
Start: 2022-02-10 | End: 2022-02-17

## 2022-02-10 RX ORDER — FLUTICASONE PROPIONATE 50 MCG
2 SPRAY, SUSPENSION (ML) NASAL DAILY
Qty: 11.1 ML | Refills: 2 | Status: SHIPPED | OUTPATIENT
Start: 2022-02-10

## 2022-02-10 NOTE — PROGRESS NOTES
You have chosen to receive care through a telephone visit. Do you consent to use a telephone visit for your medical care today? Yes    EDY Herrera; REBECA Reynoso; and the patient were all involved in today's telemedicine visit

## 2022-02-10 NOTE — PROGRESS NOTES
Office Visit      Patient Name: Arnol Fritz  : 1970   MRN: [id number]   Care Team: Patient Care Team:  Alcira Hernandez APRN as PCP - General (Family Medicine)    Chief Complaint  Establish Care (covid positive last, )    Subjective     Subjective      Arnol Fritz presents to [facility] for medication refills and COVID-19 positive.   Tested positive for COVID last Monday.   She is still complaining of cough, shortness of breath, headache, sinus pressure, head and chest congestion, and bilateral otalgia R > L.  Denies fever, body aches, chest pain, and unilateral leg swelling.   She has tried albuterol inhaler, mucinex DM, and sudafed which has not helped her congestion much but did help her headache.   States she gets pneumonia easily and starting to feel similar to how she has felt in the past.     Review of Systems   Constitutional: Positive for fatigue. Negative for chills and fever.   HENT: Positive for congestion and sinus pressure. Negative for postnasal drip, sneezing, sore throat, swollen glands and trouble swallowing.    Respiratory: Positive for cough. Negative for shortness of breath and wheezing.    Cardiovascular: Negative for chest pain.   Gastrointestinal: Negative for abdominal pain, diarrhea, nausea and vomiting.   Musculoskeletal: Negative for arthralgias.   Skin: Negative for rash.   Neurological: Positive for headache.   Psychiatric/Behavioral: Negative for sleep disturbance.        Objective     Objective   Vital Signs:   There were no vitals taken for this visit.  Unable to obtain vital signs  Physical Exam   Constitutional: She appears well-developed and well-nourished. No distress.   HENT:   Nose: Congestion present. Right sinus exhibits maxillary sinus tenderness. Left sinus exhibits maxillary sinus tenderness.   Mouth/Throat: Oropharynx is clear and moist.   Pulmonary/Chest: Effort normal.  No respiratory distress. She no audible  wheeze...  Coughing frequently during exam     Abdominal: Abdomen appears normal. There is no abdominal tenderness.   Neurological: She is alert.   Skin: No rash noted.   Psychiatric: She has a normal mood and affect.        Assessment / Plan         Assessment/Plan  Problem List Items Addressed This Visit     None      Visit Diagnoses     COVID-19    -  Primary    Acute non-recurrent maxillary sinusitis        Due to worsening symptoms and no improvement will treat for suspected sinusitis with augmentin BID x 7 days. Encouraged to take with food. Recommend phenergan-DM only at night for bothersome cough, flonase, mucinex BID, rest, push fluids, and finish full antibiotic regimen. Return if symptoms do not improve with these measures. ER with any chest pain.         I spent 17 minutes caring for Arnol on this date of service. This time includes time spent by me in the following activities:preparing for the visit, performing a medically appropriate examination and/or evaluation , counseling and educating the patient/family/caregiver, ordering medications, tests, or procedures and documenting information in the medical record    Follow Up   Return if symptoms worsen or fail to improve.  Patient was given instructions and counseling regarding her condition or for health maintenance advice. Please see specific information pulled into the AVS if appropriate.     You have chosen to receive care through a telehealth visit.  Do you consent to use a video/audio connection for your medical care today? Yes     EDY Herrera  [facility]

## 2022-04-27 ENCOUNTER — OFFICE VISIT (OUTPATIENT)
Dept: INTERNAL MEDICINE | Facility: CLINIC | Age: 52
End: 2022-04-27

## 2022-04-27 ENCOUNTER — TRANSCRIBE ORDERS (OUTPATIENT)
Dept: ADMINISTRATIVE | Facility: HOSPITAL | Age: 52
End: 2022-04-27

## 2022-04-27 VITALS
HEIGHT: 63 IN | TEMPERATURE: 97.7 F | DIASTOLIC BLOOD PRESSURE: 78 MMHG | SYSTOLIC BLOOD PRESSURE: 122 MMHG | BODY MASS INDEX: 37.17 KG/M2 | WEIGHT: 209.8 LBS | OXYGEN SATURATION: 97 % | HEART RATE: 68 BPM

## 2022-04-27 DIAGNOSIS — R63.5 WEIGHT GAIN, ABNORMAL: ICD-10-CM

## 2022-04-27 DIAGNOSIS — R09.89 THROAT FULLNESS: ICD-10-CM

## 2022-04-27 DIAGNOSIS — R53.83 FATIGUE, UNSPECIFIED TYPE: Primary | ICD-10-CM

## 2022-04-27 DIAGNOSIS — Z12.31 VISIT FOR SCREENING MAMMOGRAM: Primary | ICD-10-CM

## 2022-04-27 DIAGNOSIS — L65.9 HAIR LOSS: ICD-10-CM

## 2022-04-27 DIAGNOSIS — R13.19 OTHER DYSPHAGIA: ICD-10-CM

## 2022-04-27 DIAGNOSIS — E66.09 CLASS 2 OBESITY DUE TO EXCESS CALORIES WITHOUT SERIOUS COMORBIDITY WITH BODY MASS INDEX (BMI) OF 37.0 TO 37.9 IN ADULT: ICD-10-CM

## 2022-04-27 PROCEDURE — 99214 OFFICE O/P EST MOD 30 MIN: CPT | Performed by: NURSE PRACTITIONER

## 2022-04-27 NOTE — PROGRESS NOTES
Office Visit      Patient Name: Arnol Fritz  : 1970   MRN: [id number]   Care Team: Patient Care Team:  Alcira Hernandez APRN as PCP - General (Family Medicine)    Chief Complaint  Fatigue (Issues with her throat, her hair falls out in clumps, weight gain which has worsened, the fatigue has worsened effecting her daily activity )    Subjective     Subjective      Arnol Fritz presents to [facility] to discuss thyroid concerns.  Symptoms have been present for several years, that appear to be worsening.  Endorses chronic fatigue, weight gain, brittle hair/nails, hair loss, difficulty losing weight, difficulty swallowing, fullness in the throat.  Of note she did have COVID-19 in January.  Symptoms have really been worsening over the last year.  Had a full work-up in  that was negative for any thyroid dysfunction, abnormal cortisol and a thyroid ultrasound was performed showing multinodular thyroid without concerning features.  She has not had any recent lab work.  She has a strong family history of thyroid dysfunction her mother had hyperthyroidism and did require removal of the thyroid due to cysts, this was for noncancerous reasons.  She reports she follows a healthy diet, and limits her carbohydrates and sugar intake, walks several miles per day, and watches her portion sizes.  Despite this she continues to gain weight.    Review of Systems   Constitutional: Positive for fatigue. Negative for appetite change and fever.   HENT: Positive for trouble swallowing. Negative for congestion and sore throat.    Eyes: Negative for blurred vision and visual disturbance.   Respiratory: Negative for cough, shortness of breath and wheezing.    Cardiovascular: Negative for chest pain, palpitations and leg swelling.   Gastrointestinal: Positive for constipation and GERD. Negative for abdominal pain, blood in stool, diarrhea and nausea.   Endocrine: Negative for polydipsia, polyphagia  "and polyuria.   Genitourinary: Negative for dysuria.   Musculoskeletal: Positive for arthralgias. Negative for back pain and myalgias.   Skin: Negative for rash.   Neurological: Negative for dizziness, weakness, light-headedness and headache.   Psychiatric/Behavioral: Negative for sleep disturbance and depressed mood. The patient is not nervous/anxious.        Objective     Objective   Vital Signs:   /78   Pulse 68   Temp 97.7 °F (36.5 °C) (Temporal)   Ht 160 cm (63\")   Wt 95.2 kg (209 lb 12.8 oz)   SpO2 97%   BMI 37.16 kg/m²     Physical Exam  Vitals and nursing note reviewed.   Constitutional:       General: She is not in acute distress.     Appearance: Normal appearance. She is obese. She is not toxic-appearing.   Eyes:      Pupils: Pupils are equal, round, and reactive to light.   Neck:      Thyroid: Thyromegaly present. No thyroid mass or thyroid tenderness.      Vascular: No carotid bruit.   Cardiovascular:      Rate and Rhythm: Normal rate and regular rhythm.      Heart sounds: Normal heart sounds. No murmur heard.  Pulmonary:      Effort: Pulmonary effort is normal. No respiratory distress.      Breath sounds: Normal breath sounds. No wheezing.   Abdominal:      General: Bowel sounds are normal. There is no distension.      Palpations: Abdomen is soft.      Tenderness: There is no abdominal tenderness.   Musculoskeletal:      Cervical back: Neck supple. No tenderness.   Skin:     General: Skin is warm and dry.      Findings: No rash.   Neurological:      General: No focal deficit present.      Mental Status: She is alert.   Psychiatric:         Mood and Affect: Mood normal.         Behavior: Behavior normal.          Assessment / Plan      Assessment/Plan   Problem List Items Addressed This Visit    None     Visit Diagnoses     Fatigue, unspecified type    -  Primary    Relevant Orders    CBC No Differential    Comprehensive metabolic panel    TSH Rfx On Abnormal To Free T4    Thyroid Peroxidase " Antibody    Iron and TIBC    Ferritin    Vitamin B12    US Thyroid    Hepatitis C antibody    Cortisol - AM    ACTH    Lipid panel    Hemoglobin A1c    Labs as above to rule out underlying causes.  Recommend sleep hygiene, decrease caffeine intake, and daily sun exposure.  Will escalate work-up as needed.    Hair loss        Relevant Orders    CBC No Differential    Comprehensive metabolic panel    TSH Rfx On Abnormal To Free T4    Thyroid Peroxidase Antibody    Iron and TIBC    Ferritin    Vitamin B12    US Thyroid    Hepatitis C antibody    Cortisol - AM    ACTH    Lipid panel    Hemoglobin A1c    Discussed differentials with her today, likely multifactorial.  Labs as above to rule out underlying causes.  Discussed this could be a complication from recent COVID-19 infection.  Avoid high heat on the hair and frequent hair washing.    Throat fullness        Relevant Orders    CBC No Differential    Comprehensive metabolic panel    TSH Rfx On Abnormal To Free T4    Thyroid Peroxidase Antibody    Iron and TIBC    Ferritin    Vitamin B12    US Thyroid    Hepatitis C antibody    Cortisol - AM    ACTH    Lipid panel    Hemoglobin A1c    Other dysphagia        Relevant Orders    CBC No Differential    Comprehensive metabolic panel    TSH Rfx On Abnormal To Free T4    Thyroid Peroxidase Antibody    Iron and TIBC    Ferritin    Vitamin B12    US Thyroid    Hepatitis C antibody    Cortisol - AM    ACTH    Lipid panel    Hemoglobin A1c    Will update thyroid ultrasound, previous results reviewed and thyromegaly on exam with worsening symptoms.  Will refer as appropriate.  We will need to consider other etiologies such as GERD, already on PPI continue at current dose for now.    Weight gain, abnormal        Relevant Orders    CBC No Differential    Comprehensive metabolic panel    TSH Rfx On Abnormal To Free T4    Thyroid Peroxidase Antibody    Iron and TIBC    Ferritin    Vitamin B12    US Thyroid    Hepatitis C antibody     Cortisol - AM    ACTH    Lipid panel    Hemoglobin A1c    Labs as above today.  Follow-up in 6 weeks.    Class 2 obesity due to excess calories without serious comorbidity with body mass index (BMI) of 37.0 to 37.9 in adult        Relevant Orders    CBC No Differential    Comprehensive metabolic panel    TSH Rfx On Abnormal To Free T4    Thyroid Peroxidase Antibody    Iron and TIBC    Ferritin    Vitamin B12    Hepatitis C antibody    Cortisol - AM    ACTH    Lipid panel    Hemoglobin A1c    Recommend intermittent fasting. Increase lean meats, vegetables, fruit, healthy fats, and water intake. Decrease processed foods, sugar, carbohydrates, soda, and fast food. Educated on portion control and recommendations of moderate intensity exercise 4-5 times/week. This problem will be re-evaluated at next regular visit.              Follow Up   Return in about 6 weeks (around 6/8/2022) for Annual.  Patient was given instructions and counseling regarding her condition or for health maintenance advice. Please see specific information pulled into the AVS if appropriate.     EDY Herrera  [facility] - [place]

## 2022-04-28 LAB
ACTH PLAS-MCNC: 11 PG/ML (ref 7.2–63.3)
ALBUMIN SERPL-MCNC: 4.3 G/DL (ref 3.8–4.8)
ALBUMIN/GLOB SERPL: 1.6 {RATIO} (ref 1.2–2.2)
ALP SERPL-CCNC: 83 IU/L (ref 44–121)
ALT SERPL-CCNC: 17 IU/L (ref 0–32)
AST SERPL-CCNC: 22 IU/L (ref 0–40)
BILIRUB SERPL-MCNC: 0.2 MG/DL (ref 0–1.2)
BUN SERPL-MCNC: 9 MG/DL (ref 6–24)
BUN/CREAT SERPL: 9 (ref 9–23)
CALCIUM SERPL-MCNC: 9.2 MG/DL (ref 8.7–10.2)
CHLORIDE SERPL-SCNC: 106 MMOL/L (ref 96–106)
CHOLEST SERPL-MCNC: 174 MG/DL (ref 100–199)
CO2 SERPL-SCNC: 21 MMOL/L (ref 20–29)
CORTIS AM PEAK SERPL-MCNC: 5.1 UG/DL (ref 6.2–19.4)
CREAT SERPL-MCNC: 1.02 MG/DL (ref 0.57–1)
EGFRCR SERPLBLD CKD-EPI 2021: 67 ML/MIN/1.73
ERYTHROCYTE [DISTWIDTH] IN BLOOD BY AUTOMATED COUNT: 13.9 % (ref 11.7–15.4)
FERRITIN SERPL-MCNC: 23 NG/ML (ref 15–150)
GLOBULIN SER CALC-MCNC: 2.7 G/DL (ref 1.5–4.5)
GLUCOSE SERPL-MCNC: 105 MG/DL (ref 65–99)
HBA1C MFR BLD: 6.2 % (ref 4.8–5.6)
HCT VFR BLD AUTO: 40.4 % (ref 34–46.6)
HCV AB S/CO SERPL IA: <0.1 S/CO RATIO (ref 0–0.9)
HDLC SERPL-MCNC: 33 MG/DL
HGB BLD-MCNC: 13.4 G/DL (ref 11.1–15.9)
IRON SATN MFR SERPL: 13 % (ref 15–55)
IRON SERPL-MCNC: 50 UG/DL (ref 27–159)
LDLC SERPL CALC-MCNC: 120 MG/DL (ref 0–99)
MCH RBC QN AUTO: 28.5 PG (ref 26.6–33)
MCHC RBC AUTO-ENTMCNC: 33.2 G/DL (ref 31.5–35.7)
MCV RBC AUTO: 86 FL (ref 79–97)
PLATELET # BLD AUTO: 261 X10E3/UL (ref 150–450)
POTASSIUM SERPL-SCNC: 4.8 MMOL/L (ref 3.5–5.2)
PROT SERPL-MCNC: 7 G/DL (ref 6–8.5)
RBC # BLD AUTO: 4.71 X10E6/UL (ref 3.77–5.28)
SODIUM SERPL-SCNC: 143 MMOL/L (ref 134–144)
THYROPEROXIDASE AB SERPL-ACNC: <8 IU/ML (ref 0–34)
TIBC SERPL-MCNC: 397 UG/DL (ref 250–450)
TRIGL SERPL-MCNC: 113 MG/DL (ref 0–149)
TSH SERPL DL<=0.005 MIU/L-ACNC: 2.03 UIU/ML (ref 0.45–4.5)
UIBC SERPL-MCNC: 347 UG/DL (ref 131–425)
VIT B12 SERPL-MCNC: 292 PG/ML (ref 232–1245)
VLDLC SERPL CALC-MCNC: 21 MG/DL (ref 5–40)
WBC # BLD AUTO: 5.3 X10E3/UL (ref 3.4–10.8)

## 2022-05-12 ENCOUNTER — HOSPITAL ENCOUNTER (OUTPATIENT)
Dept: MAMMOGRAPHY | Facility: HOSPITAL | Age: 52
End: 2022-05-12

## 2022-05-13 ENCOUNTER — HOSPITAL ENCOUNTER (OUTPATIENT)
Dept: ULTRASOUND IMAGING | Facility: HOSPITAL | Age: 52
Discharge: HOME | End: 2022-05-13
Admitting: NURSE PRACTITIONER

## 2022-05-13 PROCEDURE — 76536 US EXAM OF HEAD AND NECK: CPT

## 2022-06-23 ENCOUNTER — HOSPITAL ENCOUNTER (OUTPATIENT)
Dept: MAMMOGRAPHY | Facility: HOSPITAL | Age: 52
Discharge: HOME | End: 2022-06-23
Admitting: PHYSICIAN ASSISTANT

## 2022-06-23 DIAGNOSIS — Z12.31 VISIT FOR SCREENING MAMMOGRAM: ICD-10-CM

## 2022-06-23 PROCEDURE — 77063 BREAST TOMOSYNTHESIS BI: CPT

## 2022-06-23 PROCEDURE — 77063 BREAST TOMOSYNTHESIS BI: CPT | Performed by: RADIOLOGY

## 2022-06-23 PROCEDURE — 77067 SCR MAMMO BI INCL CAD: CPT | Performed by: RADIOLOGY

## 2022-06-23 PROCEDURE — 77067 SCR MAMMO BI INCL CAD: CPT

## 2023-02-03 ENCOUNTER — OFFICE VISIT (OUTPATIENT)
Dept: OBSTETRICS AND GYNECOLOGY | Facility: CLINIC | Age: 53
End: 2023-02-03
Payer: COMMERCIAL

## 2023-02-03 VITALS
WEIGHT: 198 LBS | SYSTOLIC BLOOD PRESSURE: 134 MMHG | HEIGHT: 63 IN | DIASTOLIC BLOOD PRESSURE: 80 MMHG | BODY MASS INDEX: 35.08 KG/M2

## 2023-02-03 DIAGNOSIS — N39.46 MIXED STRESS AND URGE URINARY INCONTINENCE: ICD-10-CM

## 2023-02-03 DIAGNOSIS — N90.89 PERINEAL FISSURE IN FEMALE: ICD-10-CM

## 2023-02-03 DIAGNOSIS — N95.2 POSTMENOPAUSAL ATROPHIC VAGINITIS: ICD-10-CM

## 2023-02-03 DIAGNOSIS — L81.9 DISCOLORATION OF SKIN: ICD-10-CM

## 2023-02-03 DIAGNOSIS — N95.1 MENOPAUSAL SYMPTOMS: Primary | ICD-10-CM

## 2023-02-03 PROCEDURE — 99214 OFFICE O/P EST MOD 30 MIN: CPT | Performed by: OBSTETRICS & GYNECOLOGY

## 2023-02-03 RX ORDER — ESTRADIOL 0.1 MG/G
CREAM VAGINAL
Qty: 1 EACH | Refills: 11 | Status: SHIPPED | OUTPATIENT
Start: 2023-02-03

## 2023-02-03 NOTE — PROGRESS NOTES
Chief Complaint  Menopause (Patient complains of hot flashes, difficulty sleeping, painful intercourse. Patient also complains of perineal lesion x 6 weeks. )     History of Present Illness:  Patient is 52 y.o.  who presents to [facility] for evaluation of menopausal symptoms as well as a perineal lesion.  Patient reports she has been having menopausal symptoms for the last several years.  She has had recent worsening of her hot flashes night sweats and difficulty sleeping.  She is having vaginal dryness as well as pain with intercourse.  She does report having a lesion on the perineum which has been present for the last 6 weeks.  She reports it has been painful.  She has noticed bloody drainage from the lesion.  She does report having a strong family history of thyroid disorder.  Her maternal grandmother and mother both have had their thyroids removed.  Her sister has hypothyroidism.  Patient is desiring to have hormone levels checked.  She has not had any recent laboratory assessment.  Patient is on spironolactone for her acne.  She reports she has not had a menstrual cycle since March of last year.  She also has complaints of urinary incontinence.  She has both stress as well as urge incontinence.    History  Past Medical History:   Diagnosis Date   • Acid reflux    • Anemia    • Headache    • History of Papanicolaou smear of cervix 2016    WNL   • Injury of back    • Injury of neck      Current Outpatient Medications on File Prior to Visit   Medication Sig Dispense Refill   • albuterol sulfate  (90 Base) MCG/ACT inhaler Inhale 2 puffs Every 4 (Four) Hours As Needed for Wheezing. 1 inhaler 0   • fluticasone (Flonase) 50 MCG/ACT nasal spray 2 sprays into the nostril(s) as directed by provider Daily. 11.1 mL 2   • omeprazole (priLOSEC) 20 MG capsule 20 mg Daily.     • promethazine-dextromethorphan (PROMETHAZINE-DM) 6.25-15 MG/5ML syrup Take 5 mL by mouth At Night As  "Needed for Cough. 118 mL 0   • promethazine-dextromethorphan (PROMETHAZINE-DM) 6.25-15 MG/5ML syrup 1 tsp q 6h prn cough 120 mL 0   • Retin-A Micro Pump 0.08 % gel      • spironolactone (ALDACTONE) 50 MG tablet Take 100 mg by mouth 2 (Two) Times a Day.       No current facility-administered medications on file prior to visit.     Allergies   Allergen Reactions   • Sulfa Antibiotics Anaphylaxis     Joint swelling, and closing of airway   • Solodyn [Minocycline Hcl Er] Other (See Comments)     Joint swelling     Past Surgical History:   Procedure Laterality Date   • DILATATION AND CURETTAGE     • TUBAL ABDOMINAL LIGATION     • WISDOM TOOTH EXTRACTION       Family History   Problem Relation Age of Onset   • Breast cancer Mother 65   • Thyroid disease Mother    • Arthritis Father    • Cancer Father    • Breast cancer Maternal Grandmother    • Breast cancer Maternal Aunt    • Ovarian cancer Neg Hx      Social History     Socioeconomic History   • Marital status:    Tobacco Use   • Smoking status: Never   • Smokeless tobacco: Never   Vaping Use   • Vaping Use: Never used   Substance and Sexual Activity   • Alcohol use: No   • Drug use: No   • Sexual activity: Yes     Birth control/protection: Surgical     Comment: Tubal       Physical Examination:  Vital Signs: /80   Ht 160 cm (63\")   Wt 89.8 kg (198 lb)   BMI 35.07 kg/m²     General Appearance: alert, appears stated age, and cooperative  Breasts: Not performed  Abdomen: no masses, no hepatomegaly, no splenomegaly, soft non-tender, no guarding, and no rebound tenderness  Pelvic: Clinical staff was present for exam  External genitalia: Skin discoloration of the labia.  Loss of pigment noted.  Positive parchment with fissure and adhesion noted of the perineum.  Positive tenderness to touch.  :  urethral meatus normal;  Vaginal: Atrophic changes noted; small nodule noted left perineum approx 1 cm; no drainage noted  Cervix:  normal appearance.  Uterus:  " normal size, shape and consistency.  Adnexa:  normal bimanual exam of the adnexa.  Rectal: Confirms findings    Data Review:  The following data was reviewed by: Letty Hunt MD on 02/03/2023:     Labs:  CBC No Differential (04/27/2022 09:06)  Comprehensive metabolic panel (04/27/2022 09:06)  TSH Rfx On Abnormal To Free T4 (04/27/2022 09:06)  Thyroid Peroxidase Antibody (04/27/2022 09:06)  Iron and TIBC (04/27/2022 09:06)  Ferritin (04/27/2022 09:06)  Vitamin B12 (04/27/2022 09:06)  Hepatitis C antibody (04/27/2022 09:06)  Cortisol - AM (04/27/2022 09:06)  ACTH (04/27/2022 09:06)  Lipid panel (04/27/2022 09:06)  Hemoglobin A1c (04/27/2022 09:06)    Imaging:    Medical Records:  None    Assessment and Plan   1. Menopausal symptoms  The various options for the management of menopausal symptoms was discussed.  The medical treatment options discussed include HRT, SSRIs, SSNRIs, clonidine, and gabapentin.  The risks and benefits were discussed including the findings from the WHI study.  The increased risk of breast cancer, CAD, stroke, and VTE events were discussed for combination therapy vs the increased risk of CV events and breast cancer not being seen in the estrogen only group.  The lowest effective dose for the shortest duration of treatment was discussed in regards to HRT.  Other alternatives including otc supplements and lifestyle changes were also discussed.  Local estrogen therapy to relieve atrophic vaginal symptoms was discussed was well as other alternatives.  We will obtain hormone levels today.  Patient is to consider the options as discussed.  - Follicle Stimulating Hormone  - Estradiol  - Testosterone, Free, Total  - TSH  - T4, Free  - T3, Free  - estradiol (ESTRACE VAGINAL) 0.1 MG/GM vaginal cream; Use 0.5 grams intravaginally twice weekly to control symptoms.  Dispense: 1 each; Refill: 11    2. Postmenopausal atrophic vaginitis  Discussed various options for relief of atrophic vaginal symptoms related  to menopause. Discussed local therapy for treatment of vaginal symptoms only.  Discussed the different formulation options including cream, ring, and tablets.  Discussed the low risk of systemic absorption in postmenopausal women with atrophy using 25 mcgs of estradiol on a daily basis.  Recommend low dose use 2-3x/wk for maintenance of treatment.  Other treatment options were discussed including the use of water-based and silicone-based vaginal lubricants and moisturizers.  Also discussed was the FDA approved treatment option of ospemifene for moderate to severe dyspareunia.  Prescription is given as noted.  - estradiol (ESTRACE VAGINAL) 0.1 MG/GM vaginal cream; Use 0.5 grams intravaginally twice weekly to control symptoms.  Dispense: 1 each; Refill: 11    3. Perineal fissure in female  Patient with small fissuring noted.  Prescription is given for estrogen cream.  Patient is to apply to the perineum as discussed.  Instructions and precautions are given.  She is to follow-up as noted.  - estradiol (ESTRACE VAGINAL) 0.1 MG/GM vaginal cream; Use 0.5 grams intravaginally twice weekly to control symptoms.  Dispense: 1 each; Refill: 11    4. Mixed stress and urge urinary incontinence  Arnol Fritz was informed that urinary incontinence is the involuntary leaking of urine caused by a wide variety of factors.  It is a common condition in women that increases with age.  The patient was informed that there are different types of urinary incontinence to include stress urinary incontinence, urgency urinary incontinence, and mixed urinary incontinence as well as other subtypes.  The patient was informed that the correct diagnosis is important in the evaluation and treatment of her leaking.  The basic evaluation includes patient's history and physical examination.  A urinary tract infection needs to be ruled out as well as urinary retention and overflow incontinence.  Sometimes additional specialized testing needs to be  performed such as urodynamic testing and cystoscopy.  The various treatment options were discussed ranging from conservative  treatment to include pelvic floor exercises and modifications in lifestyle, pessary, pharmacologic, to surgical.  Locally administered estrogen may be of some benefit in women with vaginal atrophy. Because treatment options vary by incontinence type and effectiveness, it is important to determine the etiology and severity of symptoms.  - estradiol (ESTRACE VAGINAL) 0.1 MG/GM vaginal cream; Use 0.5 grams intravaginally twice weekly to control symptoms.  Dispense: 1 each; Refill: 11    5. Discoloration of skin  Patient with discoloration of the vulva as noted.  Labs today as discussed.  Patient is to follow-up as noted.    Follow Up/Instructions:  Follow up as noted.  Patient was given instructions and counseling regarding her condition or for health maintenance advice. Please see specific information pulled into the AVS if appropriate.     Note: Speech recognition transcription software may have been used to dictate portions of this document.  An attempt at proofreading has been made though minor errors in transcription may still be present.    This note was electronically signed.  Ltety Hunt M.D.

## 2023-02-09 LAB
ESTRADIOL SERPL-MCNC: 9.5 PG/ML
FSH SERPL-ACNC: 86.8 MIU/ML
T3FREE SERPL-MCNC: 3 PG/ML (ref 2–4.4)
T4 FREE SERPL-MCNC: 0.9 NG/DL (ref 0.93–1.7)
TESTOST FREE SERPL-MCNC: 5.1 PG/ML (ref 0–4.2)
TESTOST SERPL-MCNC: 18 NG/DL (ref 4–50)
TSH SERPL DL<=0.005 MIU/L-ACNC: 2.21 UIU/ML (ref 0.27–4.2)

## 2023-02-23 ENCOUNTER — OFFICE VISIT (OUTPATIENT)
Dept: INTERNAL MEDICINE | Facility: CLINIC | Age: 53
End: 2023-02-23
Payer: COMMERCIAL

## 2023-02-23 ENCOUNTER — APPOINTMENT (OUTPATIENT)
Dept: CT IMAGING | Facility: HOSPITAL | Age: 53
End: 2023-02-23
Payer: COMMERCIAL

## 2023-02-23 ENCOUNTER — HOSPITAL ENCOUNTER (EMERGENCY)
Facility: HOSPITAL | Age: 53
Discharge: HOME | End: 2023-02-23
Attending: EMERGENCY MEDICINE | Admitting: EMERGENCY MEDICINE
Payer: COMMERCIAL

## 2023-02-23 VITALS
SYSTOLIC BLOOD PRESSURE: 122 MMHG | TEMPERATURE: 97.1 F | WEIGHT: 198 LBS | DIASTOLIC BLOOD PRESSURE: 82 MMHG | OXYGEN SATURATION: 97 % | BODY MASS INDEX: 35.08 KG/M2 | HEART RATE: 85 BPM | RESPIRATION RATE: 16 BRPM | HEIGHT: 63 IN

## 2023-02-23 VITALS
TEMPERATURE: 97.8 F | SYSTOLIC BLOOD PRESSURE: 120 MMHG | BODY MASS INDEX: 35.08 KG/M2 | RESPIRATION RATE: 20 BRPM | WEIGHT: 198 LBS | HEIGHT: 63 IN | OXYGEN SATURATION: 92 % | HEART RATE: 116 BPM | DIASTOLIC BLOOD PRESSURE: 69 MMHG

## 2023-02-23 DIAGNOSIS — N12 PYELONEPHRITIS: ICD-10-CM

## 2023-02-23 DIAGNOSIS — N23 RENAL COLIC: Primary | ICD-10-CM

## 2023-02-23 DIAGNOSIS — R30.0 DYSURIA: Primary | ICD-10-CM

## 2023-02-23 DIAGNOSIS — N20.1 URETERAL STONE: ICD-10-CM

## 2023-02-23 DIAGNOSIS — M54.50 LOW BACK PAIN WITHOUT SCIATICA, UNSPECIFIED BACK PAIN LATERALITY, UNSPECIFIED CHRONICITY: ICD-10-CM

## 2023-02-23 LAB
ALBUMIN SERPL-MCNC: 4.5 G/DL (ref 3.5–5.2)
ALBUMIN/GLOB SERPL: 1.4 G/DL
ALP SERPL-CCNC: 91 U/L (ref 39–117)
ALT SERPL W P-5'-P-CCNC: 26 U/L (ref 1–33)
ANION GAP SERPL CALCULATED.3IONS-SCNC: 13.5 MMOL/L (ref 5–15)
AST SERPL-CCNC: 19 U/L (ref 1–32)
BACTERIA UR QL AUTO: ABNORMAL /HPF
BASOPHILS # BLD AUTO: 0.04 10*3/MM3 (ref 0–0.2)
BASOPHILS NFR BLD AUTO: 0.3 % (ref 0–1.5)
BILIRUB BLD-MCNC: NEGATIVE MG/DL
BILIRUB SERPL-MCNC: 0.4 MG/DL (ref 0–1.2)
BILIRUB UR QL STRIP: NEGATIVE
BUN SERPL-MCNC: 16 MG/DL (ref 6–20)
BUN/CREAT SERPL: 11.1 (ref 7–25)
CALCIUM SPEC-SCNC: 9.8 MG/DL (ref 8.6–10.5)
CHLORIDE SERPL-SCNC: 103 MMOL/L (ref 98–107)
CLARITY UR: ABNORMAL
CLARITY, POC: ABNORMAL
CO2 SERPL-SCNC: 24.5 MMOL/L (ref 22–29)
COLOR UR: ABNORMAL
COLOR UR: YELLOW
CREAT SERPL-MCNC: 1.44 MG/DL (ref 0.57–1)
DEPRECATED RDW RBC AUTO: 44.3 FL (ref 37–54)
EGFRCR SERPLBLD CKD-EPI 2021: 44.1 ML/MIN/1.73
EOSINOPHIL # BLD AUTO: 0 10*3/MM3 (ref 0–0.4)
EOSINOPHIL NFR BLD AUTO: 0 % (ref 0.3–6.2)
ERYTHROCYTE [DISTWIDTH] IN BLOOD BY AUTOMATED COUNT: 14.1 % (ref 12.3–15.4)
EXPIRATION DATE: ABNORMAL
GLOBULIN UR ELPH-MCNC: 3.3 GM/DL
GLUCOSE SERPL-MCNC: 136 MG/DL (ref 65–99)
GLUCOSE UR STRIP-MCNC: NEGATIVE MG/DL
GLUCOSE UR STRIP-MCNC: NEGATIVE MG/DL
HCT VFR BLD AUTO: 41.7 % (ref 34–46.6)
HGB BLD-MCNC: 13.4 G/DL (ref 12–15.9)
HGB UR QL STRIP.AUTO: ABNORMAL
HOLD SPECIMEN: NORMAL
HOLD SPECIMEN: NORMAL
HYALINE CASTS UR QL AUTO: ABNORMAL /LPF
IMM GRANULOCYTES # BLD AUTO: 0.05 10*3/MM3 (ref 0–0.05)
IMM GRANULOCYTES NFR BLD AUTO: 0.4 % (ref 0–0.5)
KETONES UR QL STRIP: ABNORMAL
KETONES UR QL: ABNORMAL
LEUKOCYTE EST, POC: NEGATIVE
LEUKOCYTE ESTERASE UR QL STRIP.AUTO: ABNORMAL
LYMPHOCYTES # BLD AUTO: 0.73 10*3/MM3 (ref 0.7–3.1)
LYMPHOCYTES NFR BLD AUTO: 6.1 % (ref 19.6–45.3)
Lab: ABNORMAL
MCH RBC QN AUTO: 27.7 PG (ref 26.6–33)
MCHC RBC AUTO-ENTMCNC: 32.1 G/DL (ref 31.5–35.7)
MCV RBC AUTO: 86.2 FL (ref 79–97)
MONOCYTES # BLD AUTO: 0.46 10*3/MM3 (ref 0.1–0.9)
MONOCYTES NFR BLD AUTO: 3.9 % (ref 5–12)
NEUTROPHILS NFR BLD AUTO: 10.66 10*3/MM3 (ref 1.7–7)
NEUTROPHILS NFR BLD AUTO: 89.3 % (ref 42.7–76)
NITRITE UR QL STRIP: NEGATIVE
NITRITE UR-MCNC: NEGATIVE MG/ML
NRBC BLD AUTO-RTO: 0 /100 WBC (ref 0–0.2)
PH UR STRIP.AUTO: >=9 [PH] (ref 5–8)
PH UR: 8.5 [PH] (ref 5–8)
PLATELET # BLD AUTO: 285 10*3/MM3 (ref 140–450)
PMV BLD AUTO: 10.8 FL (ref 6–12)
POTASSIUM SERPL-SCNC: 4.4 MMOL/L (ref 3.5–5.2)
PROT SERPL-MCNC: 7.8 G/DL (ref 6–8.5)
PROT UR QL STRIP: ABNORMAL
PROT UR STRIP-MCNC: ABNORMAL MG/DL
RBC # BLD AUTO: 4.84 10*6/MM3 (ref 3.77–5.28)
RBC # UR STRIP: ABNORMAL /HPF
RBC # UR STRIP: ABNORMAL /UL
REF LAB TEST METHOD: ABNORMAL
SODIUM SERPL-SCNC: 141 MMOL/L (ref 136–145)
SP GR UR STRIP: 1.03 (ref 1–1.03)
SP GR UR: 1.01 (ref 1–1.03)
SQUAMOUS #/AREA URNS HPF: ABNORMAL /HPF
UROBILINOGEN UR QL STRIP: ABNORMAL
UROBILINOGEN UR QL: NORMAL
WBC # UR STRIP: ABNORMAL /HPF
WBC NRBC COR # BLD: 11.94 10*3/MM3 (ref 3.4–10.8)
WHOLE BLOOD HOLD COAG: NORMAL
WHOLE BLOOD HOLD SPECIMEN: NORMAL

## 2023-02-23 PROCEDURE — 96374 THER/PROPH/DIAG INJ IV PUSH: CPT

## 2023-02-23 PROCEDURE — 81001 URINALYSIS AUTO W/SCOPE: CPT | Performed by: EMERGENCY MEDICINE

## 2023-02-23 PROCEDURE — 99283 EMERGENCY DEPT VISIT LOW MDM: CPT

## 2023-02-23 PROCEDURE — 25010000002 KETOROLAC TROMETHAMINE PER 15 MG: Performed by: EMERGENCY MEDICINE

## 2023-02-23 PROCEDURE — 96375 TX/PRO/DX INJ NEW DRUG ADDON: CPT

## 2023-02-23 PROCEDURE — 80053 COMPREHEN METABOLIC PANEL: CPT | Performed by: EMERGENCY MEDICINE

## 2023-02-23 PROCEDURE — 85025 COMPLETE CBC W/AUTO DIFF WBC: CPT | Performed by: EMERGENCY MEDICINE

## 2023-02-23 PROCEDURE — 36415 COLL VENOUS BLD VENIPUNCTURE: CPT

## 2023-02-23 PROCEDURE — 96372 THER/PROPH/DIAG INJ SC/IM: CPT | Performed by: INTERNAL MEDICINE

## 2023-02-23 PROCEDURE — 99214 OFFICE O/P EST MOD 30 MIN: CPT | Performed by: INTERNAL MEDICINE

## 2023-02-23 PROCEDURE — 25010000002 ONDANSETRON PER 1 MG: Performed by: EMERGENCY MEDICINE

## 2023-02-23 PROCEDURE — 74176 CT ABD & PELVIS W/O CONTRAST: CPT

## 2023-02-23 PROCEDURE — 81003 URINALYSIS AUTO W/O SCOPE: CPT | Performed by: INTERNAL MEDICINE

## 2023-02-23 RX ORDER — KETOROLAC TROMETHAMINE 10 MG/1
10 TABLET, FILM COATED ORAL EVERY 6 HOURS PRN
Qty: 15 TABLET | Refills: 0 | Status: SHIPPED | OUTPATIENT
Start: 2023-02-23 | End: 2023-03-22

## 2023-02-23 RX ORDER — ONDANSETRON 2 MG/ML
4 INJECTION INTRAMUSCULAR; INTRAVENOUS ONCE
Status: COMPLETED | OUTPATIENT
Start: 2023-02-23 | End: 2023-02-23

## 2023-02-23 RX ORDER — SODIUM CHLORIDE 0.9 % (FLUSH) 0.9 %
10 SYRINGE (ML) INJECTION AS NEEDED
Status: DISCONTINUED | OUTPATIENT
Start: 2023-02-23 | End: 2023-02-23 | Stop reason: HOSPADM

## 2023-02-23 RX ORDER — ONDANSETRON 4 MG/1
4 TABLET, ORALLY DISINTEGRATING ORAL 4 TIMES DAILY PRN
Qty: 20 TABLET | Refills: 0 | Status: SHIPPED | OUTPATIENT
Start: 2023-02-23 | End: 2023-03-22

## 2023-02-23 RX ORDER — CEFTRIAXONE 1 G/1
1 INJECTION, POWDER, FOR SOLUTION INTRAMUSCULAR; INTRAVENOUS ONCE
Status: COMPLETED | OUTPATIENT
Start: 2023-02-23 | End: 2023-02-23

## 2023-02-23 RX ORDER — KETOROLAC TROMETHAMINE 30 MG/ML
15 INJECTION, SOLUTION INTRAMUSCULAR; INTRAVENOUS ONCE
Status: COMPLETED | OUTPATIENT
Start: 2023-02-23 | End: 2023-02-23

## 2023-02-23 RX ORDER — HYDROCODONE BITARTRATE AND ACETAMINOPHEN 5; 325 MG/1; MG/1
1 TABLET ORAL EVERY 6 HOURS PRN
Qty: 10 TABLET | Refills: 0 | Status: SHIPPED | OUTPATIENT
Start: 2023-02-23 | End: 2023-03-22

## 2023-02-23 RX ADMIN — KETOROLAC TROMETHAMINE 15 MG: 30 INJECTION, SOLUTION INTRAMUSCULAR; INTRAVENOUS at 12:24

## 2023-02-23 RX ADMIN — CEFTRIAXONE 1 G: 1 INJECTION, POWDER, FOR SOLUTION INTRAMUSCULAR; INTRAVENOUS at 09:49

## 2023-02-23 RX ADMIN — ONDANSETRON 4 MG: 2 INJECTION INTRAMUSCULAR; INTRAVENOUS at 12:24

## 2023-02-23 RX ADMIN — SODIUM CHLORIDE 1000 ML: 9 INJECTION, SOLUTION INTRAVENOUS at 12:23

## 2023-02-23 NOTE — PROGRESS NOTES
"Subjective     Patient ID: Arnol Fritz is a 52 y.o. female. Patient is here for management of multiple medical problems.     Chief Complaint   Patient presents with   • Nausea   • Vomiting   • Back Pain   • Dysuria     History of Present Illness       N/v and thinks she has uti      Pain in right flank          The following portions of the patient's history were reviewed and updated as appropriate: allergies, current medications, past family history, past medical history, past social history, past surgical history and problem list.    Review of Systems    Current Outpatient Medications:   •  albuterol sulfate  (90 Base) MCG/ACT inhaler, Inhale 2 puffs Every 4 (Four) Hours As Needed for Wheezing., Disp: 1 inhaler, Rfl: 0  •  estradiol (ESTRACE VAGINAL) 0.1 MG/GM vaginal cream, Use 0.5 grams intravaginally twice weekly to control symptoms., Disp: 1 each, Rfl: 11  •  fluticasone (Flonase) 50 MCG/ACT nasal spray, 2 sprays into the nostril(s) as directed by provider Daily., Disp: 11.1 mL, Rfl: 2  •  omeprazole (priLOSEC) 20 MG capsule, 20 mg Daily., Disp: , Rfl:   •  Retin-A Micro Pump 0.08 % gel, , Disp: , Rfl:   •  spironolactone (ALDACTONE) 50 MG tablet, Take 100 mg by mouth 2 (Two) Times a Day., Disp: , Rfl:     Current Facility-Administered Medications:   •  cefTRIAXone (ROCEPHIN) injection 1 g, 1 g, Intramuscular, Once, Harrison Gage MD    Objective      Blood pressure 122/82, pulse 85, temperature 97.1 °F (36.2 °C), resp. rate 16, height 160 cm (63\"), weight 89.8 kg (198 lb), SpO2 97 %, not currently breastfeeding.    Physical Exam     General Appearance:    Alert, cooperative, no distress, appears stated age   Head:    Normocephalic, without obvious abnormality, atraumatic   Eyes:    PERRL, conjunctiva/corneas clear, EOM's intact   Ears:    Normal TM's and external ear canals, both ears   Nose:   Nares normal, septum midline, mucosa normal, no drainage   or sinus tenderness   Throat:   Lips, mucosa, " and tongue normal; teeth and gums normal   Neck:   Supple, symmetrical, trachea midline, no adenopathy;        thyroid:  No enlargement/tenderness/nodules; no carotid    bruit or JVD   Back:     Symmetric, no curvature, ROM normal, no CVA tenderness   Lungs:     Clear to auscultation bilaterally, respirations unlabored   Chest wall:    No tenderness or deformity   Heart:    Regular rate and rhythm, S1 and S2 normal, no murmur,        rub or gallop   Abdomen:     Soft, non-tender, bowel sounds active all four quadrants,     no masses, no organomegaly   Extremities:   Extremities normal, atraumatic, no cyanosis or edema   Pulses:   2+ and symmetric all extremities   Skin:   Skin color, texture, turgor normal, no rashes or lesions   Lymph nodes:   Cervical, supraclavicular, and axillary nodes normal   Neurologic:   CNII-XII intact. Normal strength, sensation and reflexes       throughout      Results for orders placed or performed in visit on 02/23/23   POCT urinalysis dipstick, automated    Specimen: Urine   Result Value Ref Range    Color Dark Yellow Yellow, Straw, Dark Yellow, Pao    Clarity, UA Cloudy (A) Clear    Specific Gravity  1.010 1.005 - 1.030    pH, Urine 8.5 (A) 5.0 - 8.0    Leukocytes Negative Negative    Nitrite, UA Negative Negative    Protein, POC 1+ (A) Negative mg/dL    Glucose, UA Negative Negative mg/dL    Ketones, UA 2+ (A) Negative    Urobilinogen, UA Normal Normal, 0.2 E.U./dL    Bilirubin Negative Negative    Blood, UA 3+ (A) Negative    Lot Number [id number]     Expiration Date 12/17/2024          Assessment & Plan       Pt in acute distress. Will give 1 gram iv rocephin now and send to er for further evaluation. Concern for early sepsis.      May need further IV abx/ ivf.  as pt with n/v/flank pain and hematuria. May need further eval for renal stone.     will discuss with ER MD.       Diagnoses and all orders for this visit:    1. Dysuria (Primary)  -     POCT urinalysis dipstick,  automated  -     cefTRIAXone (ROCEPHIN) injection 1 g    2. Low back pain without sciatica, unspecified back pain laterality, unspecified chronicity  -     POCT urinalysis dipstick, automated  -     cefTRIAXone (ROCEPHIN) injection 1 g    3. Pyelonephritis  -     cefTRIAXone (ROCEPHIN) injection 1 g      Return if symptoms worsen or fail to improve.          There are no Patient Instructions on file for this visit.     Harrison Gage MD    Assessment & Plan

## 2023-02-23 NOTE — ED PROVIDER NOTES
Subjective   History of Present Illness  52-year-old female presents to ED with chief complaint of flank pain.  Patient is complaining of sudden onset right-sided flank pain that started this morning.  Constant sharp stabbing pain in her right flank associated with nausea and vomiting.  No fever or chills.  No other complaints this time.        Review of Systems   Constitutional: Negative for fatigue and fever.   Respiratory: Negative for shortness of breath and wheezing.    Cardiovascular: Negative for chest pain and palpitations.   Gastrointestinal: Positive for nausea and vomiting.   Genitourinary: Positive for flank pain.   All other systems reviewed and are negative.      Past Medical History:   Diagnosis Date   • Acid reflux    • Anemia    • Headache    • History of Papanicolaou smear of cervix 07/28/2016    WNL   • Injury of back    • Injury of neck        Allergies   Allergen Reactions   • Sulfa Antibiotics Other (See Comments)     Joint swelling, and closing of airway   • Solodyn [Minocycline Hcl Er] Other (See Comments)     Joint swelling       Past Surgical History:   Procedure Laterality Date   • DILATATION AND CURETTAGE     • TUBAL ABDOMINAL LIGATION     • WISDOM TOOTH EXTRACTION         Family History   Problem Relation Age of Onset   • Breast cancer Mother 65   • Thyroid disease Mother    • Arthritis Father    • Cancer Father    • Breast cancer Maternal Grandmother    • Breast cancer Maternal Aunt    • Ovarian cancer Neg Hx        Social History     Socioeconomic History   • Marital status:    Tobacco Use   • Smoking status: Never   • Smokeless tobacco: Never   Vaping Use   • Vaping Use: Never used   Substance and Sexual Activity   • Alcohol use: No   • Drug use: No   • Sexual activity: Yes     Birth control/protection: Surgical     Comment: Tubal           Objective   Physical Exam  Vitals and nursing note reviewed.   Constitutional:       General: She is not in acute distress.     Appearance:  She is well-developed. She is not diaphoretic.   HENT:      Head: Normocephalic and atraumatic.      Nose: Nose normal.   Eyes:      Conjunctiva/sclera: Conjunctivae normal.   Cardiovascular:      Rate and Rhythm: Normal rate and regular rhythm.      Pulses: Normal pulses.   Pulmonary:      Effort: Pulmonary effort is normal. No respiratory distress.      Breath sounds: Normal breath sounds.   Abdominal:      General: There is no distension.      Palpations: Abdomen is soft.      Tenderness: There is no abdominal tenderness. There is no guarding.   Musculoskeletal:         General: No deformity.   Neurological:      Mental Status: She is alert and oriented to person, place, and time.      Cranial Nerves: No cranial nerve deficit.         Procedures           ED Course                                           MDM  Chief complaint: Flank pain    Initial impression of presenting illness: 52-year-old female with flank pain    Comorbidities requiring consideration and/or management:    Differential diagnosis includes but not limited to: Urinary tract infection,    Patient arrives hemodynamically stable, afebrile, without respiratory distress with initial vitals interpreted by myself.  Pertinent initial vitals include heart rate of 91, respiratory rate of 14 /80    Pertinent features from physical exam: Well-appearing, nontoxic    Initial diagnostic plan: CBC, CMP, urinalysis, CT abdomen pelvis    Results from initial plan were reviewed and interpreted by myself and include: CT abdomen pelvis shows 4 mm distal ureteral stone.  Urinalysis negative for infection.  CBC and CMP are relatively reassuring with a mild LAURA.      Diagnostic information from other sources includes: Review of previous visits, prior labs, prior imaging, available notes from prior evaluations or visits with specialists, medication list, allergies, past medical history, past surgical history    Interventions in the ED included: IV Toradol, IV  Zofran    Reevaluation: Comfortably, pain controlled, nausea control    Results/clinical rationale were discussed with patient and family at bedside    Consultations and discussions of results with other physicians: N/A    Discussion of results/management/plan:    Disposition plan: Appropriate for discharge with continued symptomatic management.  Patient is agreeable to this plan      Final diagnoses:   Renal colic   Ureteral stone       ED Disposition  ED Disposition     ED Disposition   Discharge    Condition   Stable    Comment   --             Alcira Hernandez N, APRN  [address] [facility]  [address]  [place] [id number] [phone]          Espinoza Uribe MD  [address] [facility]  [address]  [place] [id number] [phone]    Schedule an appointment as soon as possible for a visit            Medication List      New Prescriptions    HYDROcodone-acetaminophen 5-325 MG per tablet  Commonly known as: NORCO  Take 1 tablet by mouth Every 6 (Six) Hours As Needed for Severe Pain.     ketorolac 10 MG tablet  Commonly known as: TORADOL  Take 1 tablet by mouth Every 6 (Six) Hours As Needed for Severe Pain.     ondansetron ODT 4 MG disintegrating tablet  Commonly known as: ZOFRAN-ODT  Place 1 tablet on the tongue 4 (Four) Times a Day As Needed for Nausea.           Where to Get Your Medications      These medications were sent to [id number] - [place], [place] - [address] - [phone] PH - [phone] FX  [address] [place] [address]    Phone: [phone]   · HYDROcodone-acetaminophen 5-325 MG per tablet  · ketorolac 10 MG tablet  · ondansetron ODT 4 MG disintegrating tablet          Dandy Hurtado, DO  02/23/23 1450

## 2023-02-27 ENCOUNTER — TELEPHONE (OUTPATIENT)
Dept: OBSTETRICS AND GYNECOLOGY | Facility: CLINIC | Age: 53
End: 2023-02-27
Payer: COMMERCIAL

## 2023-02-27 NOTE — TELEPHONE ENCOUNTER
Patient called today stating that you had the patient schedule for an ultrasound. Patient was recently seen in ED and had a CT abd/pelvis. Patient is asking if we still need to do the ultrasound tomorrow.

## 2023-03-06 ENCOUNTER — TELEPHONE (OUTPATIENT)
Dept: OBSTETRICS AND GYNECOLOGY | Facility: CLINIC | Age: 53
End: 2023-03-06
Payer: COMMERCIAL

## 2023-03-06 NOTE — TELEPHONE ENCOUNTER
----- Message from Letty Hunt MD sent at 3/2/2023  7:13 AM EST -----  Need to inform patient I have reviewed her ultrasound.  The patient has 2 fibroids.  One is measuring 1 cm.  The other is measuring 2.1 cm.  Her endometrium however is measuring 9.1 mm.  Given the patient's labs show her in the menopausal range this is abnormal.  Patient needs endometrial sampling.  I recommend a D&C with diagnostic hysteroscopy for further evaluation.  Thank you.

## 2023-03-22 ENCOUNTER — PROCEDURE VISIT (OUTPATIENT)
Dept: OBSTETRICS AND GYNECOLOGY | Facility: CLINIC | Age: 53
End: 2023-03-22
Payer: COMMERCIAL

## 2023-03-22 VITALS
DIASTOLIC BLOOD PRESSURE: 72 MMHG | HEIGHT: 63 IN | WEIGHT: 197 LBS | SYSTOLIC BLOOD PRESSURE: 122 MMHG | BODY MASS INDEX: 34.91 KG/M2

## 2023-03-22 DIAGNOSIS — N90.89 PERINEAL FISSURE IN FEMALE: ICD-10-CM

## 2023-03-22 DIAGNOSIS — L81.9 DISCOLORATION OF SKIN: ICD-10-CM

## 2023-03-22 DIAGNOSIS — N95.2 POSTMENOPAUSAL ATROPHIC VAGINITIS: ICD-10-CM

## 2023-03-22 DIAGNOSIS — Z12.11 COLON CANCER SCREENING: Primary | ICD-10-CM

## 2023-03-22 DIAGNOSIS — N39.46 MIXED STRESS AND URGE URINARY INCONTINENCE: ICD-10-CM

## 2023-03-22 DIAGNOSIS — R79.89 ELEVATED TESTOSTERONE LEVEL IN FEMALE: ICD-10-CM

## 2023-03-22 DIAGNOSIS — N95.1 MENOPAUSAL SYMPTOMS: ICD-10-CM

## 2023-03-22 DIAGNOSIS — D25.1 INTRAMURAL LEIOMYOMA OF UTERUS: ICD-10-CM

## 2023-03-22 DIAGNOSIS — R93.5 ABNORMAL ULTRASOUND OF ENDOMETRIUM: Primary | ICD-10-CM

## 2023-03-23 LAB — REF LAB TEST METHOD: NORMAL

## 2023-03-23 NOTE — PROGRESS NOTES
Chief Complaint  Procedure (Endometrial biopsy- thickened endometrium. )     History of Present Illness:  Patient is 52 y.o.  who presents to [facility] here for follow-up evaluation of her recent abnormal ultrasound.  Patient had previously been seen in the office.  She has complaints of menopausal symptoms.  Patient was also noted to have loss of pigmentation as well as vaginal atrophy.  She has been using her estrogen cream.  She did have labs obtained for her menopausal symptoms.  Her testosterone level was elevated.  She had a transvaginal ultrasound as well.  This showed a thickened endometrium for menopause.  She was also noted to have fibroids as well.  Patient was also seen in the ER recently with flank pain as noted.  She did have labs as well as CT scan.  Patient was noted to have a kidney stone.    History  Past Medical History:   Diagnosis Date   • Abnormal Pap smear of cervix    • Acid reflux    • Anemia    • Fibroid 2023   • Headache    • History of Papanicolaou smear of cervix 2016    WNL   • Injury of back    • Injury of neck    • Kidney stone 2023   • Multiple gestation 1995     Current Outpatient Medications on File Prior to Visit   Medication Sig Dispense Refill   • albuterol sulfate  (90 Base) MCG/ACT inhaler Inhale 2 puffs Every 4 (Four) Hours As Needed for Wheezing. 1 inhaler 0   • estradiol (ESTRACE VAGINAL) 0.1 MG/GM vaginal cream Use 0.5 grams intravaginally twice weekly to control symptoms. 1 each 11   • fluticasone (Flonase) 50 MCG/ACT nasal spray 2 sprays into the nostril(s) as directed by provider Daily. 11.1 mL 2   • omeprazole (priLOSEC) 20 MG capsule 20 mg Daily.     • Retin-A Micro Pump 0.08 % gel      • spironolactone (ALDACTONE) 50 MG tablet Take 100 mg by mouth 2 (Two) Times a Day.       No current facility-administered medications on file prior to visit.     Allergies   Allergen Reactions   • Sulfa  "Antibiotics Other (See Comments)     Joint swelling, and closing of airway   • Solodyn [Minocycline Hcl Er] Other (See Comments)     Joint swelling     Past Surgical History:   Procedure Laterality Date   • D & C WITH SUCTION  2001   • DILATATION AND CURETTAGE     • TUBAL ABDOMINAL LIGATION     • WISDOM TOOTH EXTRACTION       Family History   Problem Relation Age of Onset   • Breast cancer Mother    • Thyroid disease Mother    • Arthritis Father    • Cancer Father    • Breast cancer Maternal Grandmother    • Breast cancer Maternal Aunt    • Breast cancer Maternal Aunt    • Breast cancer Maternal Aunt    • Ovarian cancer Neg Hx      Social History     Socioeconomic History   • Marital status:    Tobacco Use   • Smoking status: Never   • Smokeless tobacco: Never   • Tobacco comments:     None   Vaping Use   • Vaping Use: Never used   Substance and Sexual Activity   • Alcohol use: No   • Drug use: No   • Sexual activity: Yes     Partners: Male     Birth control/protection: Tubal ligation     Comment: Tubal       Physical Examination:  Vital Signs: /72   Ht 160 cm (63\")   Wt 89.4 kg (197 lb)   BMI 34.90 kg/m²     General Appearance: alert, appears stated age, and cooperative  Breasts: Not performed  Abdomen: no masses, no hepatomegaly, no splenomegaly, soft non-tender, no guarding, and no rebound tenderness  Pelvic: Clinical staff was present for exam  External genitalia: [place] of the vulva is noted.  :  urethral meatus normal;  Vaginal: Atrophic changes noted  Cervix:  normal appearance.  Uterus:  normal size, shape and consistency.  Adnexa:  normal bimanual exam of the adnexa.    Data Review:  The following data was reviewed by: Letty Hunt MD on 03/22/2023:     Labs:  Follicle Stimulating Hormone (02/03/2023 09:36)  Estradiol (02/03/2023 09:36)  Testosterone, Free, Total (02/03/2023 09:36)  Urinalysis With Microscopic If Indicated (No Culture) - Urine, Clean Catch (02/23/2023 " 12:09)  Urinalysis, Microscopic Only - Urine, Clean Catch (02/23/2023 12:09)  Comprehensive Metabolic Panel (02/23/2023 12:32)  CBC & Differential (02/23/2023 12:32)    Imaging:  US Non-ob Transvaginal (02/28/2023 08:39)  CT Abdomen Pelvis Stone Protocol (02/23/2023 13:26)    Medical Records:  ED Provider Notes by Dandy Hurtado DO (02/23/2023 14:47)    Endometrial Biopsy    Date of procedure:  3/23/2023    Indication:  Abnormal endometrium             Informed Consent Obtained    Procedure documentation:    The patient was placed in the dorsal lithotomy position.  A speculum was placed in the vagina.  The cervix was prepped. The cervix was grasped anterior at the 12 o'clock position.  The cavity sounded to 8 centimeters.  An endometrial biopsy specimen was obtained with multiple passes.  The tissue was sent for permanent histopathologic evaluation.  Tenaculum was removed from the cervix with scant bleeding.  The patient tolerated the procedure without any complications.    Post procedure instructions: She was instructed to call the office in 1 week if she has not heard from us otherwise.  If there is any significant fever, cramping, malodorous discharge, or heavy bleeding she is to call the office immediately or go to the ER.        Assessment and Plan   1. Menopausal symptoms  Patient with continued menopausal symptoms.  I discussed with the patient the various options for management of her symptoms.  Would hold on any hormone replacement therapy at this time.    2. Postmenopausal atrophic vaginitis  With continued atrophic changes.  She is to continue the use of her estrogen cream as discussed.    3. Discoloration of skin  Patient with discoloration and loss of pigment as noted.  She has been informed regarding those findings.    6. Abnormal ultrasound of endometrium  Patient with thickened endometrium as noted on transvaginal ultrasound.  She did have labs showing in the menopausal range.  Endometrial biopsy  is obtained today.  Plan pending results.  - TISSUE EXAM, [facility] ([facility])    7. Intramural leiomyoma of uterus  Patient with small intramural fibroid is noted.  She is informed regarding those findings.  Patient will need follow-up scan in 3 to 4 months to ensure stability.    8. Elevated testosterone level in female  Patient with elevated testosterone level as noted.  She did have CT scan in the emergency room as well.  Ultrasound has been obtained as well.    Follow Up/Instructions:  Follow up as noted.  Patient was given instructions and counseling regarding her condition or for health maintenance advice. Please see specific information pulled into the AVS if appropriate.     Note: Speech recognition transcription software may have been used to dictate portions of this document.  An attempt at proofreading has been made though minor errors in transcription may still be present.    This note was electronically signed.  Letty Hunt M.D.

## 2023-03-30 ENCOUNTER — PREP FOR SURGERY (OUTPATIENT)
Dept: OTHER | Facility: HOSPITAL | Age: 53
End: 2023-03-30
Payer: COMMERCIAL

## 2023-03-30 DIAGNOSIS — N95.0 PMB (POSTMENOPAUSAL BLEEDING): Primary | ICD-10-CM

## 2023-03-30 DIAGNOSIS — R93.5 ABNORMAL ULTRASOUND OF ENDOMETRIUM: ICD-10-CM

## 2023-03-30 RX ORDER — SODIUM CHLORIDE 9 MG/ML
40 INJECTION, SOLUTION INTRAVENOUS AS NEEDED
OUTPATIENT
Start: 2023-03-30

## 2023-03-30 RX ORDER — SODIUM CHLORIDE 0.9 % (FLUSH) 0.9 %
10 SYRINGE (ML) INJECTION AS NEEDED
OUTPATIENT
Start: 2023-03-30

## 2023-03-30 RX ORDER — SODIUM CHLORIDE 0.9 % (FLUSH) 0.9 %
3 SYRINGE (ML) INJECTION EVERY 12 HOURS SCHEDULED
OUTPATIENT
Start: 2023-03-30

## 2023-03-31 PROBLEM — N95.0 PMB (POSTMENOPAUSAL BLEEDING): Status: ACTIVE | Noted: 2023-03-31

## 2023-03-31 PROBLEM — R93.5 ABNORMAL ULTRASOUND OF ENDOMETRIUM: Status: ACTIVE | Noted: 2023-03-31

## 2023-04-13 ENCOUNTER — PRE-ADMISSION TESTING (OUTPATIENT)
Dept: PREADMISSION TESTING | Facility: HOSPITAL | Age: 53
End: 2023-04-13
Payer: COMMERCIAL

## 2023-04-13 VITALS — HEIGHT: 63 IN | BODY MASS INDEX: 35.44 KG/M2 | WEIGHT: 200 LBS

## 2023-04-13 DIAGNOSIS — N95.0 PMB (POSTMENOPAUSAL BLEEDING): ICD-10-CM

## 2023-04-13 DIAGNOSIS — R93.5 ABNORMAL ULTRASOUND OF ENDOMETRIUM: ICD-10-CM

## 2023-04-13 LAB
BASOPHILS # BLD AUTO: 0.06 10*3/MM3 (ref 0–0.2)
BASOPHILS NFR BLD AUTO: 1.1 % (ref 0–1.5)
BILIRUB UR QL STRIP: NEGATIVE
CLARITY UR: CLEAR
COLOR UR: YELLOW
DEPRECATED RDW RBC AUTO: 46.4 FL (ref 37–54)
EOSINOPHIL # BLD AUTO: 0.15 10*3/MM3 (ref 0–0.4)
EOSINOPHIL NFR BLD AUTO: 2.8 % (ref 0.3–6.2)
ERYTHROCYTE [DISTWIDTH] IN BLOOD BY AUTOMATED COUNT: 14.4 % (ref 12.3–15.4)
GLUCOSE UR STRIP-MCNC: NEGATIVE MG/DL
HCT VFR BLD AUTO: 41.9 % (ref 34–46.6)
HGB BLD-MCNC: 13.3 G/DL (ref 12–15.9)
HGB UR QL STRIP.AUTO: NEGATIVE
IMM GRANULOCYTES # BLD AUTO: 0.02 10*3/MM3 (ref 0–0.05)
IMM GRANULOCYTES NFR BLD AUTO: 0.4 % (ref 0–0.5)
KETONES UR QL STRIP: ABNORMAL
LEUKOCYTE ESTERASE UR QL STRIP.AUTO: NEGATIVE
LYMPHOCYTES # BLD AUTO: 1.78 10*3/MM3 (ref 0.7–3.1)
LYMPHOCYTES NFR BLD AUTO: 32.9 % (ref 19.6–45.3)
MCH RBC QN AUTO: 27.9 PG (ref 26.6–33)
MCHC RBC AUTO-ENTMCNC: 31.7 G/DL (ref 31.5–35.7)
MCV RBC AUTO: 88 FL (ref 79–97)
MONOCYTES # BLD AUTO: 0.55 10*3/MM3 (ref 0.1–0.9)
MONOCYTES NFR BLD AUTO: 10.2 % (ref 5–12)
NEUTROPHILS NFR BLD AUTO: 2.85 10*3/MM3 (ref 1.7–7)
NEUTROPHILS NFR BLD AUTO: 52.6 % (ref 42.7–76)
NITRITE UR QL STRIP: NEGATIVE
NRBC BLD AUTO-RTO: 0 /100 WBC (ref 0–0.2)
PH UR STRIP.AUTO: 5.5 [PH] (ref 5–8)
PLATELET # BLD AUTO: 278 10*3/MM3 (ref 140–450)
PMV BLD AUTO: 11 FL (ref 6–12)
PROT UR QL STRIP: NEGATIVE
RBC # BLD AUTO: 4.76 10*6/MM3 (ref 3.77–5.28)
SP GR UR STRIP: 1.02 (ref 1–1.03)
UROBILINOGEN UR QL STRIP: ABNORMAL
WBC NRBC COR # BLD: 5.41 10*3/MM3 (ref 3.4–10.8)

## 2023-04-13 PROCEDURE — 36415 COLL VENOUS BLD VENIPUNCTURE: CPT

## 2023-04-13 PROCEDURE — 81003 URINALYSIS AUTO W/O SCOPE: CPT

## 2023-04-13 PROCEDURE — 85025 COMPLETE CBC W/AUTO DIFF WBC: CPT

## 2023-04-26 PROCEDURE — S0260 H&P FOR SURGERY: HCPCS | Performed by: OBSTETRICS & GYNECOLOGY

## 2023-04-26 NOTE — H&P
NIKKI Martins  Arnol Fritz  : 1970  MRN: [id number]  [facility]: [id number]    History and Physical  Subjective   Arnol Fritz is a 52 y.o. year old  who presents for surgery due to postmenopausal bleeding and abnormal endometrium.  Patient was seen in the office with complaints of irregular bleeding and menopausal symptoms.  Her hormone levels did show her in the menopausal range.  She had a transvaginal ultrasound showing a thickened endometrium.  She had a 1 cm intramural fibroid.  There is also noted to be a 2.1 cm hypoechoic complex mass seen in the cervix consistent with possible fibroid.  Her endometrium measured 9.1 mm.  Her endometrial biopsy was insufficient.  Patient is here for further evaluation with D&C and diagnostic hysteroscopy.    History  Past Medical History:   Diagnosis Date   • Abnormal Pap smear of cervix    • Acid reflux    • Anemia    • Fibroid 2023   • Headache    • History of Papanicolaou smear of cervix 2016    WNL   • Injury of back    • Injury of neck    • Kidney stone 2023   • Multiple gestation 1995   • PONV (postoperative nausea and vomiting)      No current facility-administered medications on file prior to encounter.     Current Outpatient Medications on File Prior to Encounter   Medication Sig Dispense Refill   • albuterol sulfate  (90 Base) MCG/ACT inhaler Inhale 2 puffs Every 4 (Four) Hours As Needed for Wheezing. 1 inhaler 0   • estradiol (ESTRACE VAGINAL) 0.1 MG/GM vaginal cream Use 0.5 grams intravaginally twice weekly to control symptoms. (Patient taking differently: Daily. Use 0.5 grams intravaginally twice weekly to control symptoms.) 1 each 11   • fluticasone (Flonase) 50 MCG/ACT nasal spray 2 sprays into the nostril(s) as directed by provider Daily. (Patient taking differently: 2 sprays into the nostril(s) as directed by provider Daily As Needed.) 11.1 mL 2   • omeprazole (priLOSEC) 20 MG capsule 1 capsule Daily.     •  Retin-A Micro Pump 0.08 % gel Every Night.     • spironolactone (ALDACTONE) 50 MG tablet Take 1 tablet by mouth 2 (Two) Times a Day.       Allergies   Allergen Reactions   • Sulfa Antibiotics Other (See Comments)     Joint swelling, and closing of airway   • Solodyn [Minocycline Hcl Er] Other (See Comments)     Joint swelling     Past Surgical History:   Procedure Laterality Date   • D & C WITH SUCTION  2001   • DILATATION AND CURETTAGE     • TUBAL ABDOMINAL LIGATION     • WISDOM TOOTH EXTRACTION       Family History   Problem Relation Age of Onset   • Breast cancer Mother    • Thyroid disease Mother    • Arthritis Father    • Cancer Father    • Breast cancer Maternal Grandmother    • Breast cancer Maternal Aunt    • Breast cancer Maternal Aunt    • Breast cancer Maternal Aunt    • Ovarian cancer Neg Hx      Social History     Socioeconomic History   • Marital status:    Tobacco Use   • Smoking status: Never   • Smokeless tobacco: Never   • Tobacco comments:     None   Vaping Use   • Vaping Use: Never used   Substance and Sexual Activity   • Alcohol use: No   • Drug use: No   • Sexual activity: Defer     Comment: Tubal     Review of Systems  The following systems were reviewed and negative:  constitution, eyes, ENT, respiratory, cardiovascular, gastrointestinal, genitourinary, integument, breast, hematologic / lymphatic, musculoskeletal, neurological, behavioral/psych, endocrine and allergies / immunologic    Objective  Recent Vitals       2/23/2023 2/23/2023 3/22/2023       BP: 126/71 120/69 122/72     Weight: -- -- 89.4 kg (197 lb)     BMI (Calculated): -- -- 34.9         Physical Exam:  General Appearance:  Alert and cooperative, not in any acute distress.   Head:  Atraumatic and normocephalic, without obvious abnormality.   Eyes:          PERRLA, conjunctivae and sclerae normal, no Icterus. No pallor. Extraocular movements are within normal limits.   Ears:  Ears appear intact with no abnormalities noted.    Throat: No oral lesions, no thrush, oral mucosa moist.   Neck: Supple, trachea midline, no thyromegaly, no carotid bruit.   Back:   No kyphoscoliosis present. No tenderness to palpation,   range of motion normal.  No CVAT.   Lungs:   Chest shape is normal. Breath sounds heard bilaterally equally.  No crackles or wheezing. No Pleural rub or bronchial breathing. Normal respiratory effort.   Heart:  Normal S1 and S2, no murmur, no gallop, no rub. No JVD.   Abdomen:   Normal bowel sounds, no masses, no hepatomegaly, no splenomegaly. Soft, non-tender, no guarding, no rebound tenderness.   Extremities: Moves all extremities well, no edema, no cyanosis, no clubbing.  Normal range of motion. Normal strength and tone.   Skin: No bleeding, bruising or rash. No palpable masses noted or induration.   Psychiatric: Alert and oriented  to time, place, and person. Appropriate mood and affect. Thought content organized and appropriate judgment.       Recent Labs  Lab Results (last 7 days)     ** No results found for the last 168 hours. **           Recent Imaging  No radiology results for the last 10 days        Assessment   1. Postmenopausal bleeding  2. Abnormal endometrium on ultrasound  3. Fibroids     Plan   1. D&C with diagnostic hysteroscopy.  2. ABx and DVT prophylaxis as indicated.  3. Risks, complications, benefits, and other alternatives discussed.  4. All questions answered and pt in agreement with plan.    Letty Hunt M.D.  4/26/2023  07:32 EDT

## 2023-04-27 ENCOUNTER — TELEPHONE (OUTPATIENT)
Dept: OBSTETRICS AND GYNECOLOGY | Facility: CLINIC | Age: 53
End: 2023-04-27
Payer: COMMERCIAL

## 2023-04-27 ENCOUNTER — HOSPITAL ENCOUNTER (OUTPATIENT)
Facility: HOSPITAL | Age: 53
Setting detail: HOSPITAL OUTPATIENT SURGERY
Discharge: HOME | End: 2023-04-27
Attending: OBSTETRICS & GYNECOLOGY | Admitting: OBSTETRICS & GYNECOLOGY
Payer: COMMERCIAL

## 2023-04-27 ENCOUNTER — ANESTHESIA (OUTPATIENT)
Dept: PERIOP | Facility: HOSPITAL | Age: 53
End: 2023-04-27
Payer: COMMERCIAL

## 2023-04-27 ENCOUNTER — ANESTHESIA EVENT (OUTPATIENT)
Dept: PERIOP | Facility: HOSPITAL | Age: 53
End: 2023-04-27
Payer: COMMERCIAL

## 2023-04-27 VITALS
TEMPERATURE: 98.7 F | RESPIRATION RATE: 16 BRPM | HEART RATE: 72 BPM | DIASTOLIC BLOOD PRESSURE: 65 MMHG | OXYGEN SATURATION: 93 % | SYSTOLIC BLOOD PRESSURE: 108 MMHG

## 2023-04-27 DIAGNOSIS — R93.5 ABNORMAL ULTRASOUND OF ENDOMETRIUM: ICD-10-CM

## 2023-04-27 DIAGNOSIS — N95.0 PMB (POSTMENOPAUSAL BLEEDING): ICD-10-CM

## 2023-04-27 PROCEDURE — 25010000002 HYDROMORPHONE PER 4 MG: Performed by: NURSE ANESTHETIST, CERTIFIED REGISTERED

## 2023-04-27 PROCEDURE — 58558 HYSTEROSCOPY BIOPSY: CPT | Performed by: OBSTETRICS & GYNECOLOGY

## 2023-04-27 PROCEDURE — 25010000002 MIDAZOLAM PER 1MG: Performed by: NURSE ANESTHETIST, CERTIFIED REGISTERED

## 2023-04-27 PROCEDURE — 25010000002 DEXAMETHASONE PER 1 MG: Performed by: NURSE ANESTHETIST, CERTIFIED REGISTERED

## 2023-04-27 PROCEDURE — 25010000002 KETOROLAC TROMETHAMINE PER 15 MG: Performed by: NURSE ANESTHETIST, CERTIFIED REGISTERED

## 2023-04-27 PROCEDURE — 0 LIDOCAINE 1 % SOLUTION: Performed by: OBSTETRICS & GYNECOLOGY

## 2023-04-27 PROCEDURE — 25010000002 PROPOFOL 10 MG/ML EMULSION: Performed by: NURSE ANESTHETIST, CERTIFIED REGISTERED

## 2023-04-27 PROCEDURE — 25010000002 ONDANSETRON PER 1 MG: Performed by: NURSE ANESTHETIST, CERTIFIED REGISTERED

## 2023-04-27 RX ORDER — SODIUM CHLORIDE 9 MG/ML
40 INJECTION, SOLUTION INTRAVENOUS AS NEEDED
Status: DISCONTINUED | OUTPATIENT
Start: 2023-04-27 | End: 2023-04-27 | Stop reason: HOSPADM

## 2023-04-27 RX ORDER — PROPOFOL 10 MG/ML
VIAL (ML) INTRAVENOUS AS NEEDED
Status: DISCONTINUED | OUTPATIENT
Start: 2023-04-27 | End: 2023-04-27 | Stop reason: SURG

## 2023-04-27 RX ORDER — IBUPROFEN 800 MG/1
800 TABLET ORAL EVERY 8 HOURS PRN
Qty: 30 TABLET | Refills: 0 | Status: SHIPPED | OUTPATIENT
Start: 2023-04-27

## 2023-04-27 RX ORDER — KETOROLAC TROMETHAMINE 30 MG/ML
INJECTION, SOLUTION INTRAMUSCULAR; INTRAVENOUS AS NEEDED
Status: DISCONTINUED | OUTPATIENT
Start: 2023-04-27 | End: 2023-04-27 | Stop reason: SURG

## 2023-04-27 RX ORDER — ONDANSETRON 2 MG/ML
4 INJECTION INTRAMUSCULAR; INTRAVENOUS ONCE AS NEEDED
Status: DISCONTINUED | OUTPATIENT
Start: 2023-04-27 | End: 2023-04-27 | Stop reason: HOSPADM

## 2023-04-27 RX ORDER — ONDANSETRON 2 MG/ML
INJECTION INTRAMUSCULAR; INTRAVENOUS AS NEEDED
Status: DISCONTINUED | OUTPATIENT
Start: 2023-04-27 | End: 2023-04-27 | Stop reason: SURG

## 2023-04-27 RX ORDER — DEXAMETHASONE SODIUM PHOSPHATE 4 MG/ML
INJECTION, SOLUTION INTRA-ARTICULAR; INTRALESIONAL; INTRAMUSCULAR; INTRAVENOUS; SOFT TISSUE AS NEEDED
Status: DISCONTINUED | OUTPATIENT
Start: 2023-04-27 | End: 2023-04-27 | Stop reason: SURG

## 2023-04-27 RX ORDER — LIDOCAINE HYDROCHLORIDE 10 MG/ML
INJECTION, SOLUTION INFILTRATION; PERINEURAL AS NEEDED
Status: DISCONTINUED | OUTPATIENT
Start: 2023-04-27 | End: 2023-04-27 | Stop reason: HOSPADM

## 2023-04-27 RX ORDER — SCOLOPAMINE TRANSDERMAL SYSTEM 1 MG/1
1 PATCH, EXTENDED RELEASE TRANSDERMAL ONCE
Status: DISCONTINUED | OUTPATIENT
Start: 2023-04-27 | End: 2023-04-27 | Stop reason: HOSPADM

## 2023-04-27 RX ORDER — MIDAZOLAM HYDROCHLORIDE 2 MG/2ML
INJECTION, SOLUTION INTRAMUSCULAR; INTRAVENOUS AS NEEDED
Status: DISCONTINUED | OUTPATIENT
Start: 2023-04-27 | End: 2023-04-27 | Stop reason: SURG

## 2023-04-27 RX ORDER — ONDANSETRON 4 MG/1
4 TABLET, FILM COATED ORAL ONCE AS NEEDED
Status: DISCONTINUED | OUTPATIENT
Start: 2023-04-27 | End: 2023-04-27 | Stop reason: HOSPADM

## 2023-04-27 RX ORDER — ACETAMINOPHEN 500 MG
1000 TABLET ORAL EVERY 8 HOURS PRN
Qty: 60 TABLET | Refills: 0 | Status: SHIPPED | OUTPATIENT
Start: 2023-04-27

## 2023-04-27 RX ORDER — HYDROMORPHONE HCL 110MG/55ML
PATIENT CONTROLLED ANALGESIA SYRINGE INTRAVENOUS AS NEEDED
Status: DISCONTINUED | OUTPATIENT
Start: 2023-04-27 | End: 2023-04-27 | Stop reason: SURG

## 2023-04-27 RX ORDER — SODIUM CHLORIDE 0.9 % (FLUSH) 0.9 %
10 SYRINGE (ML) INJECTION AS NEEDED
Status: DISCONTINUED | OUTPATIENT
Start: 2023-04-27 | End: 2023-04-27 | Stop reason: HOSPADM

## 2023-04-27 RX ORDER — ONDANSETRON HYDROCHLORIDE 8 MG/1
8 TABLET, FILM COATED ORAL EVERY 8 HOURS PRN
Qty: 15 TABLET | Refills: 0 | Status: SHIPPED | OUTPATIENT
Start: 2023-04-27

## 2023-04-27 RX ORDER — PROMETHAZINE HYDROCHLORIDE 25 MG/1
12.5 TABLET ORAL ONCE AS NEEDED
Status: DISCONTINUED | OUTPATIENT
Start: 2023-04-27 | End: 2023-04-27 | Stop reason: HOSPADM

## 2023-04-27 RX ORDER — SODIUM CHLORIDE 0.9 % (FLUSH) 0.9 %
3 SYRINGE (ML) INJECTION EVERY 12 HOURS SCHEDULED
Status: DISCONTINUED | OUTPATIENT
Start: 2023-04-27 | End: 2023-04-27 | Stop reason: HOSPADM

## 2023-04-27 RX ORDER — SODIUM CHLORIDE, SODIUM LACTATE, POTASSIUM CHLORIDE, CALCIUM CHLORIDE 600; 310; 30; 20 MG/100ML; MG/100ML; MG/100ML; MG/100ML
1000 INJECTION, SOLUTION INTRAVENOUS CONTINUOUS
Status: DISCONTINUED | OUTPATIENT
Start: 2023-04-27 | End: 2023-04-27 | Stop reason: HOSPADM

## 2023-04-27 RX ORDER — MAGNESIUM HYDROXIDE 1200 MG/15ML
LIQUID ORAL AS NEEDED
Status: DISCONTINUED | OUTPATIENT
Start: 2023-04-27 | End: 2023-04-27 | Stop reason: HOSPADM

## 2023-04-27 RX ORDER — LIDOCAINE HYDROCHLORIDE 20 MG/ML
INJECTION, SOLUTION INTRAVENOUS AS NEEDED
Status: DISCONTINUED | OUTPATIENT
Start: 2023-04-27 | End: 2023-04-27 | Stop reason: SURG

## 2023-04-27 RX ADMIN — DEXAMETHASONE SODIUM PHOSPHATE 4 MG: 4 INJECTION, SOLUTION INTRAMUSCULAR; INTRAVENOUS at 13:37

## 2023-04-27 RX ADMIN — SCOLOPAMINE TRANSDERMAL SYSTEM 1 PATCH: 1 PATCH, EXTENDED RELEASE TRANSDERMAL at 12:42

## 2023-04-27 RX ADMIN — HYDROMORPHONE HYDROCHLORIDE 0.5 MG: 2 INJECTION, SOLUTION INTRAMUSCULAR; INTRAVENOUS; SUBCUTANEOUS at 13:27

## 2023-04-27 RX ADMIN — HYDROMORPHONE HYDROCHLORIDE 0.5 MG: 2 INJECTION, SOLUTION INTRAMUSCULAR; INTRAVENOUS; SUBCUTANEOUS at 13:55

## 2023-04-27 RX ADMIN — KETOROLAC TROMETHAMINE 15 MG: 30 INJECTION, SOLUTION INTRAMUSCULAR at 13:37

## 2023-04-27 RX ADMIN — MIDAZOLAM HYDROCHLORIDE 2 MG: 1 INJECTION, SOLUTION INTRAMUSCULAR; INTRAVENOUS at 13:27

## 2023-04-27 RX ADMIN — ONDANSETRON 4 MG: 2 INJECTION INTRAMUSCULAR; INTRAVENOUS at 13:37

## 2023-04-27 RX ADMIN — PROPOFOL 50 MG: 10 INJECTION, EMULSION INTRAVENOUS at 13:30

## 2023-04-27 RX ADMIN — LIDOCAINE HYDROCHLORIDE 60 MG: 20 INJECTION, SOLUTION INTRAVENOUS at 13:30

## 2023-04-27 RX ADMIN — PROPOFOL 150 MCG/KG/MIN: 10 INJECTION, EMULSION INTRAVENOUS at 13:30

## 2023-04-27 RX ADMIN — SODIUM CHLORIDE, POTASSIUM CHLORIDE, SODIUM LACTATE AND CALCIUM CHLORIDE 1000 ML: 600; 310; 30; 20 INJECTION, SOLUTION INTRAVENOUS at 11:18

## 2023-04-27 NOTE — OP NOTE
BH Eliezer Fritz  : 1970  MRN: [id number]  [facility]: [id number]  Date: 2023    Operative Report    Pre-op Diagnosis:  PMB (postmenopausal bleeding) [N95.0]  Abnormal ultrasound of endometrium [R93.5]     Post-op Diagnosis:  Post-Op Diagnosis Codes:     * PMB (postmenopausal bleeding) [N95.0]     * Abnormal ultrasound of endometrium [R93.5]     Procedure: Procedure(s):  DILATATION AND CURETTAGE HYSTEROSCOPY     Surgeon: Letty Hunt M.D.     Assist: Staff was responsible for performing the following activities: Retraction and Suction and their skilled assistance was necessary for the success of this case.     OR Staff: Circulator: Meera Arrieta RN  Scrub Person: Alyssa Yun     Anesthesia:  IV sedation with 1% lidocaine paracervical block     Estimated Blood Loss:  5 mls     Specimens:  Endometrial     Findings:  Grossly normal-appearing endometrium with bilateral ostia identified.     Complications: none       Description of Procedure:  After the appropriate time out and adequate dosing of her anesthesia, the patient had been prepped and draped in the usual sterile fashion.  She was placed in the dorsal lithotomy position using Leland stirrups.  The bladder had been drained with a red rubber catheter per nursing.  A weighted speculum was placed in the vagina.  The anterior lip of the cervix was grasped with a single-tooth tenaculum.  The cervix was injected at the 3 o'clock and 9 o'clock position with 1% lidocaine plain without any complications.  The cervix was then progressively dilated using Langford dilators.  Rigid hysteroscopy was then performed with the above findings noted.  Sharp curettings were then obtained with a good cry throughout with tissue retrieved and sent for pathologic specimen.  The cervical tenaculum was removed and the cervix was noted to be hemostatic after the application of 2-0 chromic suture in a figure-of-eight fashion.  All instrument and sponge counts were correct  at the end of the procedure.  The patient tolerated the procedure well.  There were no complications.  She was taken to the postoperative recovery room in stable condition.    Letty Hunt M.D.  4/27/2023  13:50 EDT

## 2023-04-27 NOTE — TELEPHONE ENCOUNTER
Caller: Arnol Fritz    Relationship: Self    Best call back number: [phone]    What was the call regarding: PT REQUESTING A DR NOTE FOR HER WORK THE OUT PATIENT PROCEDURE SHE HAD TODAY.   MUST SUBMIT TO HER EMPLOYER WITHIN 24 HOURS.   WOULD LIKE FOR IT TO BE UPLOADED  [employer].

## 2023-04-27 NOTE — ANESTHESIA PREPROCEDURE EVALUATION
Anesthesia Evaluation     Patient summary reviewed and Nursing notes reviewed   history of anesthetic complications: PONV  NPO Solid Status: > 8 hours  NPO Liquid Status: > 8 hours           Airway   Mallampati: II  TM distance: >3 FB  Neck ROM: full  Possible difficult intubation  Dental - normal exam     Pulmonary - negative pulmonary ROS and normal exam   Cardiovascular - negative cardio ROS and normal exam  Exercise tolerance: good (4-7 METS)        Neuro/Psych  (+) headaches,    GI/Hepatic/Renal/Endo    (+) obesity,  GERD,  renal disease stones,     Musculoskeletal (-) negative ROS    Abdominal   (+) obese,    Substance History - negative use     OB/GYN negative ob/gyn ROS         Other                        Anesthesia Plan    ASA 3     MAC     (Risks and benefits discussed including risk of aspiration, recall and dental damage. All patient questions answered.    Will continue with plan of care.)  intravenous induction     Anesthetic plan, risks, benefits, and alternatives have been provided, discussed and informed consent has been obtained with: patient.  Pre-procedure education provided  Plan discussed with CRNA.        CODE STATUS:

## 2023-04-27 NOTE — ANESTHESIA POSTPROCEDURE EVALUATION
Patient: Arnol Fritz    Procedure Summary     Date: 04/27/23 Room / Location: [facility] OR 2 /  RE OR    Anesthesia Start: 1323 Anesthesia Stop: 1400    Procedure: DILATATION AND CURETTAGE HYSTEROSCOPY (Uterus) Diagnosis:       PMB (postmenopausal bleeding)      Abnormal ultrasound of endometrium      (PMB (postmenopausal bleeding) [N95.0])      (Abnormal ultrasound of endometrium [R93.5])    Surgeons: Letty Hunt MD Provider: Faina Kendall CRNA    Anesthesia Type: MAC ASA Status: 3          Anesthesia Type: MAC    Vitals  Vitals Value Taken Time   /69 04/27/23 1402   Temp 98.7 °F (37.1 °C) 04/27/23 1402   Pulse 89 04/27/23 1402   Resp 18 04/27/23 1402   SpO2 92 % 04/27/23 1402           Post Anesthesia Care and Evaluation    Patient location during evaluation: PHASE II  Patient participation: complete - patient participated  Level of consciousness: awake and alert  Pain score: 0  Pain management: satisfactory to patient    Airway patency: patent  Anesthetic complications: No anesthetic complications  PONV Status: none  Cardiovascular status: acceptable and stable  Respiratory status: acceptable  Hydration status: acceptable    Comments: Vitals signs as noted in nursing documentation as per protocol.

## 2023-04-28 LAB — REF LAB TEST METHOD: NORMAL

## 2023-05-05 ENCOUNTER — OFFICE VISIT (OUTPATIENT)
Dept: OBSTETRICS AND GYNECOLOGY | Facility: CLINIC | Age: 53
End: 2023-05-05
Payer: COMMERCIAL

## 2023-05-05 VITALS
HEIGHT: 63 IN | DIASTOLIC BLOOD PRESSURE: 70 MMHG | WEIGHT: 200.6 LBS | SYSTOLIC BLOOD PRESSURE: 118 MMHG | BODY MASS INDEX: 35.54 KG/M2

## 2023-05-05 DIAGNOSIS — Z12.31 ENCOUNTER FOR SCREENING MAMMOGRAM FOR MALIGNANT NEOPLASM OF BREAST: ICD-10-CM

## 2023-05-05 DIAGNOSIS — Z09 POSTOPERATIVE FOLLOW-UP: Primary | ICD-10-CM

## 2023-05-05 NOTE — PROGRESS NOTES
Subjective   Chief Complaint   Patient presents with   • Post-op     One week post-op D&C Hysteroscopy, doing well       Arnol Fritz is a 52 y.o. year old  presenting to be seen for postop visit.  She is doing well 1 week postop D&C hysteroscopy.  Vaginal bleeding is very light at this point.  Has had normal bowel and bladder function.  Pathology notes weakly proliferative endometrium with no hyperplasia or malignancy.  Patient would like to get screening mammogram ordered.    Past Medical History:   Diagnosis Date   • Abnormal Pap smear of cervix    • Acid reflux    • Anemia    • Fibroid 2023   • Headache    • History of Papanicolaou smear of cervix 2016    WNL   • Injury of back    • Injury of neck    • Kidney stone 2023   • Multiple gestation 1995   • PONV (postoperative nausea and vomiting)         Current Outpatient Medications:   •  acetaminophen (TYLENOL) 500 MG tablet, Take 2 tablets by mouth Every 8 (Eight) Hours As Needed for Mild Pain or Moderate Pain., Disp: 60 tablet, Rfl: 0  •  albuterol sulfate  (90 Base) MCG/ACT inhaler, Inhale 2 puffs Every 4 (Four) Hours As Needed for Wheezing., Disp: 1 inhaler, Rfl: 0  •  estradiol (ESTRACE VAGINAL) 0.1 MG/GM vaginal cream, Use 0.5 grams intravaginally twice weekly to control symptoms. (Patient taking differently: Daily. Use 0.5 grams intravaginally twice weekly to control symptoms.), Disp: 1 each, Rfl: 11  •  fluticasone (Flonase) 50 MCG/ACT nasal spray, 2 sprays into the nostril(s) as directed by provider Daily. (Patient taking differently: 2 sprays into the nostril(s) as directed by provider Daily As Needed.), Disp: 11.1 mL, Rfl: 2  •  ibuprofen (ADVIL,MOTRIN) 800 MG tablet, Take 1 tablet by mouth Every 8 (Eight) Hours As Needed for Mild Pain., Disp: 30 tablet, Rfl: 0  •  omeprazole (priLOSEC) 20 MG capsule, 1 capsule Daily., Disp: , Rfl:   •  ondansetron (ZOFRAN) 8 MG tablet, Take 1 tablet by mouth Every 8  "(Eight) Hours As Needed for Nausea or Vomiting., Disp: 15 tablet, Rfl: 0  •  Retin-A Micro Pump 0.08 % gel, Every Night., Disp: , Rfl:   •  spironolactone (ALDACTONE) 50 MG tablet, Take 1 tablet by mouth 2 (Two) Times a Day., Disp: , Rfl:    Allergies   Allergen Reactions   • Sulfa Antibiotics Other (See Comments)     Joint swelling, and closing of airway   • Solodyn [Minocycline Hcl Er] Other (See Comments)     Joint swelling      Past Surgical History:   Procedure Laterality Date   • D & C HYSTEROSCOPY N/A 04/27/2023    Procedure: DILATATION AND CURETTAGE HYSTEROSCOPY;  Surgeon: Letty Hunt MD;  Location: [facility];  Service: Obstetrics/Gynecology;  Laterality: N/A;   • D & C WITH SUCTION  2001   • DILATATION AND CURETTAGE     • HYSTEROSCOPY     • TUBAL ABDOMINAL LIGATION     • WISDOM TOOTH EXTRACTION        Social History     Socioeconomic History   • Marital status:    Tobacco Use   • Smoking status: Never   • Smokeless tobacco: Never   • Tobacco comments:     None   Vaping Use   • Vaping Use: Never used   Substance and Sexual Activity   • Alcohol use: No   • Drug use: No   • Sexual activity: Defer     Birth control/protection: Tubal ligation     Comment: Tubal      Family History   Problem Relation Age of Onset   • Breast cancer Mother    • Thyroid disease Mother    • Arthritis Father    • Cancer Father    • Breast cancer Maternal Grandmother    • Breast cancer Maternal Aunt    • Breast cancer Maternal Aunt    • Breast cancer Maternal Aunt    • Ovarian cancer Neg Hx        Review of Systems   Constitutional: Negative for chills, diaphoresis and fever.   Gastrointestinal: Negative.    Genitourinary: Negative for difficulty urinating and dysuria.           Objective   /70   Ht 160 cm (63\")   Wt 91 kg (200 lb 9.6 oz)   LMP 03/28/2022   BMI 35.53 kg/m²     Physical Exam  Constitutional:       Appearance: Normal appearance. She is well-developed and well-groomed.   Eyes:      General: Lids are " normal.      Extraocular Movements: Extraocular movements intact.   Neurological:      Mental Status: She is alert.   Psychiatric:         Attention and Perception: Attention normal.         Mood and Affect: Mood normal.         Speech: Speech normal.         Behavior: Behavior is cooperative.            Result Review :           TISSUE EXAM, [facility] ([facility]) (04/27/2023 13:41)          Assessment and Plan  Diagnoses and all orders for this visit:    1. Postoperative follow-up (Primary)    2. Encounter for screening mammogram for malignant neoplasm of breast  -     Mammo Screening Digital Tomosynthesis Bilateral With CAD; Future      Patient Instructions   RTO 2 months for pap/annual             This note was electronically signed.    Shahida Berman PA-C   May 5, 2023

## 2023-06-02 DIAGNOSIS — N90.89 PERINEAL FISSURE IN FEMALE: ICD-10-CM

## 2023-06-02 DIAGNOSIS — N95.2 POSTMENOPAUSAL ATROPHIC VAGINITIS: ICD-10-CM

## 2023-06-02 DIAGNOSIS — N39.46 MIXED STRESS AND URGE URINARY INCONTINENCE: ICD-10-CM

## 2023-06-02 DIAGNOSIS — N95.1 MENOPAUSAL SYMPTOMS: ICD-10-CM

## 2023-06-02 RX ORDER — ESTRADIOL 0.1 MG/G
CREAM VAGINAL
Qty: 127.5 G | Refills: 3 | Status: SHIPPED | OUTPATIENT
Start: 2023-06-02

## 2023-08-17 ENCOUNTER — HOSPITAL ENCOUNTER (OUTPATIENT)
Dept: MAMMOGRAPHY | Facility: HOSPITAL | Age: 53
Discharge: HOME | End: 2023-08-17
Admitting: PHYSICIAN ASSISTANT
Payer: COMMERCIAL

## 2023-08-17 DIAGNOSIS — Z12.31 ENCOUNTER FOR SCREENING MAMMOGRAM FOR MALIGNANT NEOPLASM OF BREAST: ICD-10-CM

## 2023-08-17 PROCEDURE — 77067 SCR MAMMO BI INCL CAD: CPT

## 2023-08-17 PROCEDURE — 77063 BREAST TOMOSYNTHESIS BI: CPT

## 2023-08-30 ENCOUNTER — OFFICE VISIT (OUTPATIENT)
Dept: OBSTETRICS AND GYNECOLOGY | Facility: CLINIC | Age: 53
End: 2023-08-30
Payer: COMMERCIAL

## 2023-08-30 VITALS
DIASTOLIC BLOOD PRESSURE: 64 MMHG | HEIGHT: 64 IN | WEIGHT: 177 LBS | SYSTOLIC BLOOD PRESSURE: 116 MMHG | BODY MASS INDEX: 30.22 KG/M2

## 2023-08-30 DIAGNOSIS — N90.89 PERINEAL FISSURE IN FEMALE: ICD-10-CM

## 2023-08-30 DIAGNOSIS — N95.2 POSTMENOPAUSAL ATROPHIC VAGINITIS: ICD-10-CM

## 2023-08-30 DIAGNOSIS — N39.46 MIXED STRESS AND URGE URINARY INCONTINENCE: ICD-10-CM

## 2023-08-30 DIAGNOSIS — Z12.31 ENCOUNTER FOR SCREENING MAMMOGRAM FOR BREAST CANCER: ICD-10-CM

## 2023-08-30 DIAGNOSIS — Z01.411 ENCOUNTER FOR GYNECOLOGICAL EXAMINATION (GENERAL) (ROUTINE) WITH ABNORMAL FINDINGS: Primary | ICD-10-CM

## 2023-08-30 RX ORDER — ESTRADIOL 0.1 MG/G
CREAM VAGINAL
Qty: 127.5 G | Refills: 3 | Status: SHIPPED | OUTPATIENT
Start: 2023-08-30

## 2023-08-30 NOTE — PROGRESS NOTES
Chief Complaint  Gynecologic Exam     History of Present Illness:  Patient is 52 y.o.  who presents to [facility] here for her annual examination.  Patient had her last Pap smear in 2017.  Patient had previously been seen.  She was started on estrogen cream.  She only used this for 1 month.  She had vaginal dryness as well as perianal fissuring.  Patient then had a D&C for postmenopausal bleeding.  She has not resumed the estrogen cream since that time.  Patient reports she does not feel that it helped her symptoms but she had only used it for 1 month continuously.  She continues to have the urinary incontinence as well.  She did do Cologuard earlier this year.  She sees nurse practitioner Alcira Hernandez for her primary care.  She did have a mammogram earlier this month as well.    History  Past Medical History:   Diagnosis Date    Abnormal Pap smear of cervix     Acid reflux     Anemia     Fibroid 2023    Headache     History of Papanicolaou smear of cervix 2016    WNL    Injury of back     Injury of neck     Kidney stone 2023    Multiple gestation 1995    PONV (postoperative nausea and vomiting)      Current Outpatient Medications on File Prior to Visit   Medication Sig Dispense Refill    albuterol sulfate  (90 Base) MCG/ACT inhaler Inhale 2 puffs Every 4 (Four) Hours As Needed for Wheezing. (Patient taking differently: Inhale 2 puffs As Needed for Wheezing.) 1 inhaler 0    fluticasone (Flonase) 50 MCG/ACT nasal spray 2 sprays into the nostril(s) as directed by provider Daily. (Patient taking differently: 2 sprays into the nostril(s) as directed by provider As Needed.) 11.1 mL 2    omeprazole (priLOSEC) 20 MG capsule 1 capsule Daily.      promethazine-dextromethorphan (PROMETHAZINE-DM) 6.25-15 MG/5ML syrup Take 5 mL by mouth 4 (Four) Times a Day As Needed for Cough. 240 mL 0    Retin-A Micro Pump 0.08 % gel Every Night.       "spironolactone (ALDACTONE) 50 MG tablet Take 1 tablet by mouth 2 (Two) Times a Day.       No current facility-administered medications on file prior to visit.     Allergies   Allergen Reactions    Sulfa Antibiotics Other (See Comments)     Joint swelling, and closing of airway    Solodyn [Minocycline Hcl Er] Other (See Comments)     Joint swelling     Past Surgical History:   Procedure Laterality Date    D & C HYSTEROSCOPY N/A 04/27/2023    Procedure: DILATATION AND CURETTAGE HYSTEROSCOPY;  Surgeon: Letty Hunt MD;  Location: [facility] OR;  Service: Obstetrics/Gynecology;  Laterality: N/A;    D & C WITH SUCTION  2001    DILATATION AND CURETTAGE      HYSTEROSCOPY      TUBAL ABDOMINAL LIGATION      WISDOM TOOTH EXTRACTION       Family History   Problem Relation Age of Onset    Breast cancer Mother     Thyroid disease Mother     Arthritis Father     Cancer Father     Breast cancer Maternal Grandmother     Breast cancer Maternal Aunt     Breast cancer Maternal Aunt     Breast cancer Maternal Aunt     Ovarian cancer Neg Hx      Social History     Socioeconomic History    Marital status:    Tobacco Use    Smoking status: Never    Smokeless tobacco: Never    Tobacco comments:     None   Vaping Use    Vaping Use: Never used   Substance and Sexual Activity    Alcohol use: No    Drug use: No    Sexual activity: Defer     Birth control/protection: Tubal ligation     Comment: Tubal       Physical Examination:  Vital Signs: /64   Ht 162.6 cm (64\")   Wt 80.3 kg (177 lb)   BMI 30.38 kg/mý     General Appearance: alert, appears stated age, and cooperative  Breasts: Examined in supine position  Symmetric without masses or skin dimpling  Nipples normal without inversion, lesions or discharge  There are no palpable axillary nodes  Abdomen: no masses, no hepatomegaly, no splenomegaly, soft non-tender, no guarding, and no rebound tenderness  Pelvic: Clinical staff was present for exam  External genitalia:  normal " appearance of the external genitalia including Bartholin's and Clyman's glands.  :  urethral meatus normal;  Vaginal: Atrophic changes noted  Cervix:  normal appearance.  Uterus:  normal size, shape and consistency.  Adnexa:  normal bimanual exam of the adnexa.  Pap smear done and specimen sent using Thin-Prep technique    Data Review:  The following data was reviewed by: Letty Hunt MD on 08/30/2023:     Labs:  Cologuard - Stool, Per Rectum (03/31/2023 11:30)   Imaging:  Mammo Screening Digital Tomosynthesis Bilateral With CAD (08/17/2023 09:01)   Medical Records:  None    Assessment and Plan   1. Encounter for gynecological examination (general) (routine) with abnormal findings  Pap was done today.  If she does not receive the results of the Pap within 2 weeks  time, she was instructed to call to find out the results.  I explained to Arnol that the recommendations for Pap smear interval in a low risk patient has lengthened to 3 years time if cytology alone normal or  5 years time if both cytology and HPV testing were normal.  I encouraged her to be seen yearly for a full physical exam including breast and pelvic exam even during the off years when PAP's will not be performed.   - LIQUID-BASED PAP SMEAR WITH HPV GENOTYPING IF ASCUS (RE,[facility])    2. Encounter for screening mammogram for breast cancer  It is recommended per ACOG, for women at average risk to start annual mammogram screening at the age of 40 until the age of 75 and an individualized decision be made for women after age 75.  She was encouraged to continue getting yearly mammograms.  She should report any palpable breast lump(s) or skin changes regardless of mammographic findings.  I explained to Arnol that notification regarding her mammogram results will come from the center performing the study.  Our office will not be routinely calling with mammogram results.  It is her responsibility to make sure that the results from the mammogram are  communicated to her by the breast center.  If she has any questions about the results, she is welcome to call our office anytime.  The patient reports she has done her mammogram and results are noted.    Arnol was counseled regarding having clinical breast exams and breast self-awareness.  Women aged 29-39 years of age should have clinical breast exams every 1-3 years and yearly aged 40 and older.  The patient was counseled regarding breast self-awareness focusing on having a sense of what is normal for her breasts so that she can tell if there are changes.  Even small changes should be reported to provider.   - Mammo Screening Digital Tomosynthesis Bilateral With CAD; Future    3. Postmenopausal atrophic vaginitis  Patient with continued atrophic changes.  Patient is to resume her estrogen cream as previously given.  Instructions and precautions are given.  Patient is to follow-up if no improvement in her symptoms as discussed.  - estradiol (ESTRACE VAGINAL) 0.1 MG/GM vaginal cream; Use 0.5 grams intravaginally twice weekly to control symptoms.  Dispense: 127.5 g; Refill: 3    4. Mixed stress and urge urinary incontinence  Patient with continued urinary incontinence.  Patient is encouraged to resume her estrogen cream.  Instructions and precautions are given.  She is to follow-up if no improvement in her symptoms in the 3 months.  - estradiol (ESTRACE VAGINAL) 0.1 MG/GM vaginal cream; Use 0.5 grams intravaginally twice weekly to control symptoms.  Dispense: 127.5 g; Refill: 3    5. Perineal fissure in female  Patient is to continue with her estrogen cream as noted.  Instructions and precautions have been given.  - estradiol (ESTRACE VAGINAL) 0.1 MG/GM vaginal cream; Use 0.5 grams intravaginally twice weekly to control symptoms.  Dispense: 127.5 g; Refill: 3    Follow Up/Instructions:  Follow up as noted.  Patient was given instructions and counseling regarding her condition or for health maintenance advice. Please  see specific information pulled into the AVS if appropriate.     Note: Speech recognition transcription software may have been used to dictate portions of this document.  An attempt at proofreading has been made though minor errors in transcription may still be present.    This note was electronically signed.  Letty Hunt M.D.

## 2023-09-04 LAB — REF LAB TEST METHOD: NORMAL

## 2023-09-18 ENCOUNTER — APPOINTMENT (OUTPATIENT)
Dept: ULTRASOUND IMAGING | Facility: HOSPITAL | Age: 53
End: 2023-09-18
Payer: COMMERCIAL

## 2023-09-18 ENCOUNTER — TELEPHONE (OUTPATIENT)
Dept: INTERNAL MEDICINE | Facility: CLINIC | Age: 53
End: 2023-09-18
Payer: COMMERCIAL

## 2023-09-18 ENCOUNTER — HOSPITAL ENCOUNTER (EMERGENCY)
Facility: HOSPITAL | Age: 53
Discharge: HOME | End: 2023-09-18
Attending: EMERGENCY MEDICINE | Admitting: EMERGENCY MEDICINE
Payer: COMMERCIAL

## 2023-09-18 VITALS
DIASTOLIC BLOOD PRESSURE: 70 MMHG | HEIGHT: 64 IN | RESPIRATION RATE: 18 BRPM | SYSTOLIC BLOOD PRESSURE: 108 MMHG | TEMPERATURE: 97.5 F | OXYGEN SATURATION: 96 % | BODY MASS INDEX: 29.37 KG/M2 | WEIGHT: 172 LBS | HEART RATE: 71 BPM

## 2023-09-18 DIAGNOSIS — N39.0 ACUTE UTI: ICD-10-CM

## 2023-09-18 DIAGNOSIS — K82.4 GALLBLADDER POLYP: ICD-10-CM

## 2023-09-18 DIAGNOSIS — K80.20 GALLSTONES: ICD-10-CM

## 2023-09-18 DIAGNOSIS — K76.9 LIVER LESION: ICD-10-CM

## 2023-09-18 DIAGNOSIS — R10.11 RIGHT UPPER QUADRANT PAIN: Primary | ICD-10-CM

## 2023-09-18 LAB
ALBUMIN SERPL-MCNC: 4.9 G/DL (ref 3.5–5.2)
ALBUMIN/GLOB SERPL: 1.4 G/DL
ALP SERPL-CCNC: 80 U/L (ref 39–117)
ALT SERPL W P-5'-P-CCNC: 12 U/L (ref 1–33)
ANION GAP SERPL CALCULATED.3IONS-SCNC: 14.2 MMOL/L (ref 5–15)
AST SERPL-CCNC: 15 U/L (ref 1–32)
BACTERIA UR QL AUTO: ABNORMAL /HPF
BASOPHILS # BLD AUTO: 0.02 10*3/MM3 (ref 0–0.2)
BASOPHILS NFR BLD AUTO: 0.2 % (ref 0–1.5)
BILIRUB SERPL-MCNC: 0.7 MG/DL (ref 0–1.2)
BILIRUB UR QL STRIP: NEGATIVE
BUN SERPL-MCNC: 14 MG/DL (ref 6–20)
BUN/CREAT SERPL: 12.6 (ref 7–25)
CALCIUM SPEC-SCNC: 9.9 MG/DL (ref 8.6–10.5)
CHLORIDE SERPL-SCNC: 102 MMOL/L (ref 98–107)
CLARITY UR: CLEAR
CO2 SERPL-SCNC: 22.8 MMOL/L (ref 22–29)
COLOR UR: ABNORMAL
CREAT SERPL-MCNC: 1.11 MG/DL (ref 0.57–1)
D-LACTATE SERPL-SCNC: 0.8 MMOL/L (ref 0.5–2)
DEPRECATED RDW RBC AUTO: 42.9 FL (ref 37–54)
EGFRCR SERPLBLD CKD-EPI 2021: 59.9 ML/MIN/1.73
EOSINOPHIL # BLD AUTO: 0.11 10*3/MM3 (ref 0–0.4)
EOSINOPHIL NFR BLD AUTO: 1.2 % (ref 0.3–6.2)
ERYTHROCYTE [DISTWIDTH] IN BLOOD BY AUTOMATED COUNT: 14 % (ref 12.3–15.4)
GLOBULIN UR ELPH-MCNC: 3.4 GM/DL
GLUCOSE SERPL-MCNC: 103 MG/DL (ref 65–99)
GLUCOSE UR STRIP-MCNC: NEGATIVE MG/DL
HCT VFR BLD AUTO: 43.5 % (ref 34–46.6)
HGB BLD-MCNC: 14.7 G/DL (ref 12–15.9)
HGB UR QL STRIP.AUTO: NEGATIVE
HOLD SPECIMEN: NORMAL
HOLD SPECIMEN: NORMAL
HYALINE CASTS UR QL AUTO: ABNORMAL /LPF
IMM GRANULOCYTES # BLD AUTO: 0.03 10*3/MM3 (ref 0–0.05)
IMM GRANULOCYTES NFR BLD AUTO: 0.3 % (ref 0–0.5)
KETONES UR QL STRIP: ABNORMAL
LEUKOCYTE ESTERASE UR QL STRIP.AUTO: ABNORMAL
LIPASE SERPL-CCNC: 27 U/L (ref 13–60)
LYMPHOCYTES # BLD AUTO: 1.29 10*3/MM3 (ref 0.7–3.1)
LYMPHOCYTES NFR BLD AUTO: 14.3 % (ref 19.6–45.3)
MCH RBC QN AUTO: 28.4 PG (ref 26.6–33)
MCHC RBC AUTO-ENTMCNC: 33.8 G/DL (ref 31.5–35.7)
MCV RBC AUTO: 84.1 FL (ref 79–97)
MONOCYTES # BLD AUTO: 0.69 10*3/MM3 (ref 0.1–0.9)
MONOCYTES NFR BLD AUTO: 7.7 % (ref 5–12)
MUCOUS THREADS URNS QL MICRO: ABNORMAL /HPF
NEUTROPHILS NFR BLD AUTO: 6.87 10*3/MM3 (ref 1.7–7)
NEUTROPHILS NFR BLD AUTO: 76.3 % (ref 42.7–76)
NITRITE UR QL STRIP: NEGATIVE
NRBC BLD AUTO-RTO: 0 /100 WBC (ref 0–0.2)
PH UR STRIP.AUTO: 5.5 [PH] (ref 5–8)
PLATELET # BLD AUTO: 276 10*3/MM3 (ref 140–450)
PMV BLD AUTO: 10.7 FL (ref 6–12)
POTASSIUM SERPL-SCNC: 4.6 MMOL/L (ref 3.5–5.2)
PROT SERPL-MCNC: 8.3 G/DL (ref 6–8.5)
PROT UR QL STRIP: ABNORMAL
RBC # BLD AUTO: 5.17 10*6/MM3 (ref 3.77–5.28)
RBC # UR STRIP: ABNORMAL /HPF
REF LAB TEST METHOD: ABNORMAL
SODIUM SERPL-SCNC: 139 MMOL/L (ref 136–145)
SP GR UR STRIP: 1.03 (ref 1–1.03)
SQUAMOUS #/AREA URNS HPF: ABNORMAL /HPF
UROBILINOGEN UR QL STRIP: ABNORMAL
WBC # UR STRIP: ABNORMAL /HPF
WBC NRBC COR # BLD: 9.01 10*3/MM3 (ref 3.4–10.8)
WHOLE BLOOD HOLD COAG: NORMAL
WHOLE BLOOD HOLD SPECIMEN: NORMAL

## 2023-09-18 PROCEDURE — 85025 COMPLETE CBC W/AUTO DIFF WBC: CPT

## 2023-09-18 PROCEDURE — 87086 URINE CULTURE/COLONY COUNT: CPT | Performed by: PHYSICIAN ASSISTANT

## 2023-09-18 PROCEDURE — 96375 TX/PRO/DX INJ NEW DRUG ADDON: CPT

## 2023-09-18 PROCEDURE — 25010000002 ONDANSETRON PER 1 MG: Performed by: EMERGENCY MEDICINE

## 2023-09-18 PROCEDURE — 25010000002 MORPHINE PER 10 MG: Performed by: EMERGENCY MEDICINE

## 2023-09-18 PROCEDURE — 81001 URINALYSIS AUTO W/SCOPE: CPT | Performed by: EMERGENCY MEDICINE

## 2023-09-18 PROCEDURE — 99284 EMERGENCY DEPT VISIT MOD MDM: CPT

## 2023-09-18 PROCEDURE — 83605 ASSAY OF LACTIC ACID: CPT

## 2023-09-18 PROCEDURE — 96374 THER/PROPH/DIAG INJ IV PUSH: CPT

## 2023-09-18 PROCEDURE — 36415 COLL VENOUS BLD VENIPUNCTURE: CPT

## 2023-09-18 PROCEDURE — 76705 ECHO EXAM OF ABDOMEN: CPT

## 2023-09-18 PROCEDURE — 80053 COMPREHEN METABOLIC PANEL: CPT

## 2023-09-18 PROCEDURE — 83690 ASSAY OF LIPASE: CPT

## 2023-09-18 RX ORDER — FAMOTIDINE 10 MG/ML
20 INJECTION, SOLUTION INTRAVENOUS ONCE
Status: COMPLETED | OUTPATIENT
Start: 2023-09-18 | End: 2023-09-18

## 2023-09-18 RX ORDER — CEPHALEXIN 250 MG/1
500 CAPSULE ORAL ONCE
Status: COMPLETED | OUTPATIENT
Start: 2023-09-18 | End: 2023-09-18

## 2023-09-18 RX ORDER — CEPHALEXIN 500 MG/1
500 CAPSULE ORAL 4 TIMES DAILY
Qty: 27 CAPSULE | Refills: 0 | Status: SHIPPED | OUTPATIENT
Start: 2023-09-18 | End: 2023-09-25

## 2023-09-18 RX ORDER — FAMOTIDINE 20 MG/1
20 TABLET, FILM COATED ORAL 2 TIMES DAILY
Qty: 30 TABLET | Refills: 0 | Status: SHIPPED | OUTPATIENT
Start: 2023-09-18 | End: 2023-09-23

## 2023-09-18 RX ORDER — SODIUM CHLORIDE 0.9 % (FLUSH) 0.9 %
10 SYRINGE (ML) INJECTION AS NEEDED
Status: DISCONTINUED | OUTPATIENT
Start: 2023-09-18 | End: 2023-09-18 | Stop reason: HOSPADM

## 2023-09-18 RX ORDER — ONDANSETRON 4 MG/1
4 TABLET, ORALLY DISINTEGRATING ORAL EVERY 8 HOURS PRN
Qty: 12 TABLET | Refills: 0 | Status: SHIPPED | OUTPATIENT
Start: 2023-09-18

## 2023-09-18 RX ORDER — ONDANSETRON 2 MG/ML
4 INJECTION INTRAMUSCULAR; INTRAVENOUS ONCE
Status: COMPLETED | OUTPATIENT
Start: 2023-09-18 | End: 2023-09-18

## 2023-09-18 RX ADMIN — ONDANSETRON 4 MG: 2 INJECTION INTRAMUSCULAR; INTRAVENOUS at 12:58

## 2023-09-18 RX ADMIN — ALUMINUM HYDROXIDE, MAGNESIUM HYDROXIDE, AND DIMETHICONE: 400; 400; 40 SUSPENSION ORAL at 12:59

## 2023-09-18 RX ADMIN — SODIUM CHLORIDE 1000 ML: 9 INJECTION, SOLUTION INTRAVENOUS at 12:57

## 2023-09-18 RX ADMIN — MORPHINE SULFATE 4 MG: 4 INJECTION, SOLUTION INTRAMUSCULAR; INTRAVENOUS at 12:59

## 2023-09-18 RX ADMIN — FAMOTIDINE 20 MG: 10 INJECTION INTRAVENOUS at 12:59

## 2023-09-18 RX ADMIN — CEPHALEXIN 500 MG: 250 CAPSULE ORAL at 14:50

## 2023-09-18 NOTE — TELEPHONE ENCOUNTER
Patient with severe upper right side pain since 10 pm last night. Now with vomiting and fever. Asked if she should go to the ER. I told her yes.

## 2023-09-18 NOTE — DISCHARGE INSTRUCTIONS
Urinalysis is concerning for UTI.  Urine culture has been sent.  Take antibiotics as prescribed.  If urine culture indicates that you need different antibiotics we will give you a call.  Ultrasound revealed what appears to be a hemangioma on your liver but did recommend outpatient MRI for further evaluation of this lesion.  Also did reveal some tiny gallstones and polyps concerning for cholesterol buildup in gallbladder.  You should follow-up with your primary care provider for further outpatient evaluation of these findings and if abnormal, likely surgical referral for consideration of cholecystectomy.  If you were to get new or worsening symptoms or acute concerns you should return to the ER.

## 2023-09-18 NOTE — ED PROVIDER NOTES
Subjective:  Chief Complaint:  Abdominal pain    History of Present Illness:  Patient is a 53-year-old female with history of anemia, fibroids, headache, kidney stones, among others presenting to the ER with complaints of right upper quadrant pain, nausea, and vomiting that occurred last night.  Patient has not eaten due to being so nauseous.  She states that her PCP advised her to come to the ER due to concern for gallbladder issues.  She denies any known fevers or additional symptoms or complaints at this time.      Nurses Notes reviewed and agree, including vitals, allergies, social history and prior medical history.     REVIEW OF SYSTEMS: All systems reviewed and not pertinent unless noted.  Review of Systems   Constitutional:  Positive for appetite change.   Gastrointestinal:  Positive for abdominal pain, nausea and vomiting.   All other systems reviewed and are negative.    Past Medical History:   Diagnosis Date    Abnormal Pap smear of cervix 1991    Acid reflux     Anemia     Fibroid 03/2023    Headache     History of Papanicolaou smear of cervix 07/28/2016    WNL    Injury of back     Injury of neck     Kidney stone 03/2023    Multiple gestation 02/12/1995 06/19/2001    PONV (postoperative nausea and vomiting)        Allergies:    Sulfa antibiotics and Solodyn [minocycline hcl er]      Past Surgical History:   Procedure Laterality Date    D & C HYSTEROSCOPY N/A 04/27/2023    Procedure: DILATATION AND CURETTAGE HYSTEROSCOPY;  Surgeon: Letty Hunt MD;  Location: [facility];  Service: Obstetrics/Gynecology;  Laterality: N/A;    D & C WITH SUCTION  2001    DILATATION AND CURETTAGE      HYSTEROSCOPY      TUBAL ABDOMINAL LIGATION      WISDOM TOOTH EXTRACTION           Social History     Socioeconomic History    Marital status:    Tobacco Use    Smoking status: Never    Smokeless tobacco: Never    Tobacco comments:     None   Vaping Use    Vaping Use: Never used   Substance and Sexual Activity     "Alcohol use: No    Drug use: No    Sexual activity: Defer     Birth control/protection: Tubal ligation     Comment: Tubal         Family History   Problem Relation Age of Onset    Breast cancer Mother     Thyroid disease Mother     Arthritis Father     Cancer Father     Breast cancer Maternal Grandmother     Breast cancer Maternal Aunt     Breast cancer Maternal Aunt     Breast cancer Maternal Aunt     Ovarian cancer Neg Hx        Objective  Physical Exam:  /69 (BP Location: Left arm, Patient Position: Sitting)   Pulse 115   Temp 98.3 °F (36.8 °C) (Oral)   Resp 18   Ht 162.6 cm (64\")   Wt 78 kg (172 lb)   LMP 03/28/2022   SpO2 95%   BMI 29.52 kg/m²      Physical Exam  Vitals and nursing note reviewed.   Constitutional:       General: She is not in acute distress.     Appearance: She is not toxic-appearing.   HENT:      Head: Normocephalic and atraumatic.   Eyes:      Extraocular Movements: Extraocular movements intact.   Cardiovascular:      Rate and Rhythm: Tachycardia present.      Heart sounds: Normal heart sounds.   Pulmonary:      Effort: Pulmonary effort is normal. No respiratory distress.   Abdominal:      General: There is no distension.      Palpations: Abdomen is soft.      Tenderness: There is abdominal tenderness in the right upper quadrant. There is no guarding or rebound.   Skin:     General: Skin is warm and dry.   Neurological:      General: No focal deficit present.      Mental Status: She is alert and oriented to person, place, and time.   Psychiatric:         Mood and Affect: Mood normal.         Behavior: Behavior normal.       Procedures    ED Course:         Lab Results (last 24 hours)       Procedure Component Value Units Date/Time    CBC & Differential [[id number]]  (Abnormal) Collected: 09/18/23 1148    Specimen: Blood Updated: 09/18/23 1155    Narrative:      The following orders were created for panel order CBC & Differential.  Procedure                               " Abnormality         Status                     ---------                               -----------         ------                     CBC Auto Differential[[id number]]        Abnormal            Final result                 Please view results for these tests on the individual orders.    Comprehensive Metabolic Panel [[id number]]  (Abnormal) Collected: 09/18/23 1148    Specimen: Blood Updated: 09/18/23 1213     Glucose 103 mg/dL      BUN 14 mg/dL      Creatinine 1.11 mg/dL      Sodium 139 mmol/L      Potassium 4.6 mmol/L      Chloride 102 mmol/L      CO2 22.8 mmol/L      Calcium 9.9 mg/dL      Total Protein 8.3 g/dL      Albumin 4.9 g/dL      ALT (SGPT) 12 U/L      AST (SGOT) 15 U/L      Alkaline Phosphatase 80 U/L      Total Bilirubin 0.7 mg/dL      Globulin 3.4 gm/dL      A/G Ratio 1.4 g/dL      BUN/Creatinine Ratio 12.6     Anion Gap 14.2 mmol/L      eGFR 59.9 mL/min/1.73     Narrative:      GFR Normal >60  Chronic Kidney Disease <60  Kidney Failure <15      Lipase [[id number]]  (Normal) Collected: 09/18/23 1148    Specimen: Blood Updated: 09/18/23 1213     Lipase 27 U/L     Lactic Acid, Plasma [[id number]]  (Normal) Collected: 09/18/23 1148    Specimen: Blood Updated: 09/18/23 1211     Lactate 0.8 mmol/L     CBC Auto Differential [[id number]]  (Abnormal) Collected: 09/18/23 1148    Specimen: Blood Updated: 09/18/23 1155     WBC 9.01 10*3/mm3      RBC 5.17 10*6/mm3      Hemoglobin 14.7 g/dL      Hematocrit 43.5 %      MCV 84.1 fL      MCH 28.4 pg      MCHC 33.8 g/dL      RDW 14.0 %      RDW-SD 42.9 fl      MPV 10.7 fL      Platelets 276 10*3/mm3      Neutrophil % 76.3 %      Lymphocyte % 14.3 %      Monocyte % 7.7 %      Eosinophil % 1.2 %      Basophil % 0.2 %      Immature Grans % 0.3 %      Neutrophils, Absolute 6.87 10*3/mm3      Lymphocytes, Absolute 1.29 10*3/mm3      Monocytes, Absolute 0.69 10*3/mm3      Eosinophils, Absolute 0.11 10*3/mm3      Basophils, Absolute 0.02 10*3/mm3      Immature Grans, Absolute  0.03 10*3/mm3      nRBC 0.0 /100 WBC     Urinalysis With Microscopic If Indicated (No Culture) - Urine, Clean Catch [[id number]]  (Abnormal) Collected: 09/18/23 1255    Specimen: Urine, Clean Catch Updated: 09/18/23 1304     Color, UA Dark Yellow     Appearance, UA Clear     pH, UA 5.5     Specific Gravity, UA 1.029     Glucose, UA Negative     Ketones, UA 15 mg/dL (1+)     Bilirubin, UA Negative     Blood, UA Negative     Protein, UA Trace     Leuk Esterase, UA Small (1+)     Nitrite, UA Negative     Urobilinogen, UA 1.0 E.U./dL    Urinalysis, Microscopic Only - Urine, Clean Catch [[id number]]  (Abnormal) Collected: 09/18/23 1255    Specimen: Urine, Clean Catch Updated: 09/18/23 1310     RBC, UA 0-2 /HPF      WBC, UA 6-12 /HPF      Bacteria, UA 1+ /HPF      Squamous Epithelial Cells, UA 3-6 /HPF      Hyaline Casts, UA None Seen /LPF      Mucus, UA Small/1+ /HPF      Methodology Manual Light Microscopy    Urine Culture - Urine, Urine, Clean Catch [[id number]] Collected: 09/18/23 1255    Specimen: Urine, Clean Catch Updated: 09/18/23 1326             US Gallbladder    Result Date: 9/18/2023  PROCEDURE: US GALLBLADDER-  HISTORY: RUQ pain, nausea and vomiting since last night, sent by PCP for concern for gallbladder  Multiple transverse and longitudinal scans were performed.  FINDINGS: Numerous gallbladder polyps are noted all measuring 5 mm or less. Appearance is suggestive of cholesterolosis. However there are small stones also noted in the proximal gallbladder dependent portion. No gallbladder wall thickening or surrounding fluid collection is seen.  There is a hyperechoic lesion of the anterior right liver measuring up to 23 mm. Hemangioma is strongly favored. No intrahepatic duct dilatation is identified.No extrahepatic biliary ductal dilatation is identified.      Impression:  1. Cholesterolosis. 2. Tiny gallstones without acute gallbladder disease. 3. Mass within the liver measuring up to 23 mm favor hemangioma.  Recommend nonemergent MR followup for further confirmation using hemangioma protocol.   This report was signed and finalized on 9/18/2023 2:22 PM by Douglas Bolton MD.          MDM  Patient was evaluated in the ER for right upper quadrant pain, nausea, vomiting since last night.  She is tachycardic but otherwise hemodynamically stable, afebrile, nontoxic-appearing on exam.  Differential diagnosis includes but not limited to biliary dyskinesia, cholecystitis, choledocholithiasis, gastritis, GERD, among others.  Initial plan includes CBC, CMP, lipase, lactate, urinalysis, right upper quadrant ultrasound, and treatment with IV fluids, Zofran, morphine, Pepcid, and GI cocktail.    Labs are unremarkable other than very mildly elevated creatinine which appears to be chronic issue for the patient.  Urinalysis is concerning for UTI with 1+ bacteria and 6-12 white blood cells.  Urine culture was sent.  Patient started on Keflex with first dose given in the ER.  Liver enzymes and bilirubin are within normal limits.  Ultrasound reveals findings consistent with Cholesterolisis, tiny gallstones without acute gallbladder disease and a mass within the liver which appears to be consistent with hemangioma but nonemergent MRI recommended.  Patient was advised to follow-up with her PCP as she should have an MRI as well as possibly a HIDA scan to check the gallbladder function and if these are abnormal she may need a surgical referral for possible cholecystectomy. She is agreeable with this plan.  She states medications in the ER did help and she is feeling much better.  She is on omeprazole but states that she would like to try Pepcid as she has been on omeprazole for a long time and the Pepcid seem to help today.  Prescription was sent for Pepcid and Zofran.  Patient agreeable with plan for discharge.  Precautions were given for return to the ER for any new or worsening symptoms.    Final diagnoses:   Right upper quadrant pain    Gallstones   Gallbladder polyp   Liver lesion   Acute UTI          Cookie Langford, PA-C  09/18/23 [id number]

## 2023-09-19 LAB — BACTERIA SPEC AEROBE CULT: NORMAL

## 2023-09-22 ENCOUNTER — OFFICE VISIT (OUTPATIENT)
Dept: INTERNAL MEDICINE | Facility: CLINIC | Age: 53
End: 2023-09-22
Payer: COMMERCIAL

## 2023-09-22 VITALS
BODY MASS INDEX: 29.84 KG/M2 | HEART RATE: 78 BPM | WEIGHT: 174.8 LBS | OXYGEN SATURATION: 97 % | SYSTOLIC BLOOD PRESSURE: 108 MMHG | TEMPERATURE: 97.8 F | DIASTOLIC BLOOD PRESSURE: 68 MMHG | HEIGHT: 64 IN

## 2023-09-22 DIAGNOSIS — K80.20 CALCULUS OF GALLBLADDER WITHOUT CHOLECYSTITIS WITHOUT OBSTRUCTION: Primary | ICD-10-CM

## 2023-09-22 DIAGNOSIS — R11.0 NAUSEA: ICD-10-CM

## 2023-09-22 DIAGNOSIS — D18.03 HEMANGIOMA OF LIVER: ICD-10-CM

## 2023-09-22 PROCEDURE — 99214 OFFICE O/P EST MOD 30 MIN: CPT | Performed by: NURSE PRACTITIONER

## 2023-09-22 RX ORDER — DICYCLOMINE HYDROCHLORIDE 10 MG/1
10 CAPSULE ORAL 4 TIMES DAILY PRN
Qty: 60 CAPSULE | Refills: 1 | Status: SHIPPED | OUTPATIENT
Start: 2023-09-22

## 2023-09-22 NOTE — PROGRESS NOTES
"  Office Visit      Patient Name: Arnol Fritz  : 1970   MRN: [id number]   Care Team: Patient Care Team:  Alcira Hernandez APRN as PCP - General (Family Medicine)    Chief Complaint  Hospital Follow Up Visit (Abdominal pain/)    Subjective     Subjective      Arnol Fritz presents to [facility] for ER follow-up.   Seen at [facility] ED with acute complaints of RUQ pain 4 days ago. Gallbladder US was performed revealing cholesterolosis , gallstones, and mass of the liver concerning for hemangioma.   She continues to have RUQ pain that is burning in nature, decreased appetite, vomiting with meals, and sginficant reflux. Originally given pepcid in ER but reflux worsened, restarted PPI and doing much better.   The abdominal pain does not radiate to the back but is worse with food.   She had a CTAP in February that did not reveal any abnormality of the liver.   Had D & C with Dr. Hunt earlier in year, put on topical estrogen therapy at that time.   She is here for a referral to general surgery for evaluation of gallbladder removal and to discuss further liver testing.     Objective     Objective   Vital Signs:   /68 (BP Location: Left arm, Patient Position: Sitting, Cuff Size: Adult)   Pulse 78   Temp 97.8 °F (36.6 °C) (Temporal)   Ht 162.6 cm (64\")   Wt 79.3 kg (174 lb 12.8 oz)   SpO2 97%   BMI 30.00 kg/m²     Physical Exam  Vitals and nursing note reviewed.   Constitutional:       Appearance: Normal appearance. She is normal weight. She is not ill-appearing.   Cardiovascular:      Rate and Rhythm: Normal rate and regular rhythm.      Heart sounds: Normal heart sounds. No murmur heard.  Pulmonary:      Effort: Pulmonary effort is normal.      Breath sounds: Normal breath sounds. No wheezing.   Abdominal:      General: Abdomen is flat. Bowel sounds are normal. There is no distension.      Palpations: Abdomen is soft. There is no hepatomegaly.      Tenderness: There is " abdominal tenderness (RUQ). There is no right CVA tenderness or left CVA tenderness. Positive signs include Cavazos's sign.      Hernia: No hernia is present.   Skin:     General: Skin is warm and dry.      Findings: No rash.   Neurological:      General: No focal deficit present.      Mental Status: She is alert.   Psychiatric:         Mood and Affect: Mood normal.         Behavior: Behavior normal.        Assessment / Plan      Assessment & Plan   Problem List Items Addressed This Visit    None  Visit Diagnoses       Calculus of gallbladder without cholecystitis without obstruction    -  Primary    Relevant Orders    Ambulatory Referral to General Surgery    Referral to general surgery placed. Dicyclomine as needed for spasm, educated on side effects. Low fat diet encouraged for now.     Hemangioma of liver        Relevant Orders    MRI abdomen w contrast    Possibly from topical estrogen although low systemic absorption? Further workup with MRI. Discussed plan of care with her today.     Nausea        Relevant Orders    Ambulatory Referral to General Surgery            Follow Up   Return in about 6 weeks (around 11/3/2023) for Annual.  Patient was given instructions and counseling regarding her condition or for health maintenance advice. Please see specific information pulled into the AVS if appropriate.     EDY Herrera  [facility]

## 2023-10-15 ENCOUNTER — PATIENT MESSAGE (OUTPATIENT)
Dept: INTERNAL MEDICINE | Facility: CLINIC | Age: 53
End: 2023-10-15
Payer: COMMERCIAL

## 2023-10-16 NOTE — TELEPHONE ENCOUNTER
From: Arnol Fritz  To: Alcira Hernandez  Sent: 10/15/2023 1:30 PM EDT  Subject: Request    Rashaad Eli,   I was supposed to travel to [place] this week for a work conference. Since I have not been feeling well, I feel like traveling right now might be a little hard on me. I am still having a lot of trouble with nausea and vomiting and the meds make me very drowsy. I was not able to get an appointment with Dr. Tomas for the surgical consult until October 26th so I have not yet been able to see her. My employer is ok with my not attending the conference but asked if I could get a note from my doctor. Would you be able to write me a note recommending I not travel this week? I would be very appreciative!   Thank you,   Arnol Fritz

## 2023-10-23 NOTE — PROGRESS NOTES
Subjective   Arnol Fritz is a 53 y.o. female.   Chief Complaint   Patient presents with    Cholelithiasis        History of Present Illness    Ms. Fritz comes the office today for evaluation and management of recently discovered cholelithiasis.  She was seen in the emergency department 9/18/2023 complaining of right lower quadrant abdominal pain, nausea, and vomiting that had occurred throughout the night before and continued into the day of evaluation.  She was evaluated in the emergency department and noted to have normal LFTs.  Her white blood cell count was also normal.  She subsequently underwent an ultrasound of the right upper quadrant demonstrating tiny gallstones without evidence of acute cholecystitis.  Note was made of cholesterolosis.  Additionally, a suspected 2.3 cm hemangioma was seen in the liver.    The patient reports associated symptoms of month ago.  She has been cautious regarding her diet, but continues to have symptoms.      The following portions of the patient's history were reviewed and updated as appropriate: allergies, current medications, past family history, past medical history, past social history, past surgical history, and problem list.    Patient Active Problem List   Diagnosis    Gastroesophageal reflux disease without esophagitis    Acne vulgaris    Lumbar sprain    Cervical sprain, initial encounter    PMB (postmenopausal bleeding)    Abnormal ultrasound of endometrium       Past Medical History:   Diagnosis Date    Abnormal Pap smear of cervix 1991    Acid reflux     Anemia     Fibroid 03/2023    Headache     History of Papanicolaou smear of cervix 07/28/2016    WNL    Injury of back     Injury of neck     Kidney stone 03/2023    Multiple gestation 02/12/1995 06/19/2001    PONV (postoperative nausea and vomiting)        Past Surgical History:   Procedure Laterality Date    D & C HYSTEROSCOPY N/A 04/27/2023    Procedure: DILATATION AND CURETTAGE HYSTEROSCOPY;  Surgeon:  Letty Hunt MD;  Location: [facility];  Service: Obstetrics/Gynecology;  Laterality: N/A;    D & C WITH SUCTION  2001    HYSTEROSCOPY      TUBAL ABDOMINAL LIGATION      WISDOM TOOTH EXTRACTION         Medications:     Current Outpatient Medications:     albuterol sulfate  (90 Base) MCG/ACT inhaler, Inhale 2 puffs Every 4 (Four) Hours As Needed for Wheezing. (Patient taking differently: Inhale 2 puffs As Needed for Wheezing.), Disp: 1 inhaler, Rfl: 0    dicyclomine (BENTYL) 10 MG capsule, Take 1 capsule by mouth 4 (Four) Times a Day As Needed for Abdominal Cramping., Disp: 60 capsule, Rfl: 1    estradiol (ESTRACE VAGINAL) 0.1 MG/GM vaginal cream, Use 0.5 grams intravaginally twice weekly to control symptoms., Disp: 127.5 g, Rfl: 3    fluticasone (Flonase) 50 MCG/ACT nasal spray, 2 sprays into the nostril(s) as directed by provider Daily. (Patient taking differently: 2 sprays into the nostril(s) as directed by provider As Needed.), Disp: 11.1 mL, Rfl: 2    ondansetron ODT (ZOFRAN-ODT) 4 MG disintegrating tablet, Place 1 tablet on the tongue Every 8 (Eight) Hours As Needed for Nausea or Vomiting., Disp: 12 tablet, Rfl: 0    Retin-A Micro Pump 0.08 % gel, Every Night., Disp: , Rfl:     spironolactone (ALDACTONE) 50 MG tablet, Take 1 tablet by mouth 2 (Two) Times a Day., Disp: , Rfl:     Allergies:   Allergies   Allergen Reactions    Sulfa Antibiotics Other (See Comments)     Joint swelling, and closing of airway    Solodyn [Minocycline Hcl Er] Other (See Comments)     Joint swelling         Family History   Problem Relation Age of Onset    Breast cancer Mother 65    Thyroid disease Mother     Heart attack Mother         duering chemo for lung cancer    Lung cancer Mother     Arthritis Father     Tongue cancer Father     Breast cancer Maternal Grandmother     Breast cancer Maternal Aunt     Breast cancer Maternal Aunt     Breast cancer Maternal Aunt     Ovarian cancer Neg Hx        Social History  "    Socioeconomic History    Marital status:    Tobacco Use    Smoking status: Never    Smokeless tobacco: Never    Tobacco comments:     None   Vaping Use    Vaping Use: Never used   Substance and Sexual Activity    Alcohol use: No    Drug use: No    Sexual activity: Defer     Birth control/protection: Tubal ligation     Comment: Tubal         Review of Systems   Constitutional:  Negative for diaphoresis, unexpected weight gain and unexpected weight loss.   HENT:  Negative for hearing loss, trouble swallowing and voice change.    Eyes:  Negative for visual disturbance.   Respiratory:  Negative for apnea, cough, chest tightness, shortness of breath and wheezing.    Cardiovascular:  Negative for chest pain, palpitations and leg swelling.   Gastrointestinal:  Positive for abdominal pain, nausea, vomiting, GERD and indigestion. Negative for abdominal distention, anal bleeding, blood in stool, constipation, diarrhea and rectal pain.   Endocrine: Negative for cold intolerance and heat intolerance.   Genitourinary:  Negative for difficulty urinating, dysuria and flank pain.   Musculoskeletal:  Negative for back pain and gait problem.   Skin:  Negative for color change, rash, skin lesions and wound.   Neurological:  Negative for dizziness, syncope, speech difficulty, weakness, light-headedness and numbness.   Hematological:  Negative for adenopathy. Does not bruise/bleed easily.   Psychiatric/Behavioral:  The patient is not nervous/anxious.    I have reviewed and confirmed the accuracy of the ROS as documented by the MA/LPN/RN Nina Tomas MD      Objective   /80   Pulse 93   Temp 99.2 °F (37.3 °C)   Resp 10   Ht 160 cm (63\")   Wt 78.9 kg (174 lb)   LMP 03/28/2022   SpO2 96%   BMI 30.82 kg/m²     Physical Exam  Constitutional:       Appearance: She is well-developed.   HENT:      Head: Normocephalic and atraumatic.   Eyes:      General: No scleral icterus.  Cardiovascular:      Rate and Rhythm: " Regular rhythm.   Pulmonary:      Effort: Pulmonary effort is normal.   Abdominal:      General: There is no distension.      Palpations: Abdomen is soft.      Tenderness: There is no abdominal tenderness.   Skin:     General: Skin is warm and dry.   Neurological:      Mental Status: She is alert and oriented to person, place, and time.   Psychiatric:         Behavior: Behavior normal.       Outside Records:  I have taken the opportunity to review the patient's available outside records in paper format, scanned format and those available on Care Everywhere    Results Review:  I have reviewed the entirety of the patient's clinical lab results.  I have also personally reviewed the relevant patient imaging.     Component      Latest Ref Rng 9/18/2023   WBC      3.40 - 10.80 10*3/mm3 9.01    RBC      3.77 - 5.28 10*6/mm3 5.17    Hemoglobin      12.0 - 15.9 g/dL 14.7    Hematocrit      34.0 - 46.6 % 43.5    MCV      79.0 - 97.0 fL 84.1    MCH      26.6 - 33.0 pg 28.4    MCHC      31.5 - 35.7 g/dL 33.8    RDW      12.3 - 15.4 % 14.0    RDW-SD      37.0 - 54.0 fl 42.9    MPV      6.0 - 12.0 fL 10.7    Platelets      140 - 450 10*3/mm3 276    Neutrophil Rel %      42.7 - 76.0 % 76.3 (H)    Lymphocyte Rel %      19.6 - 45.3 % 14.3 (L)    Monocyte Rel %      5.0 - 12.0 % 7.7    Eosinophil Rel %      0.3 - 6.2 % 1.2    Basophil Rel %      0.0 - 1.5 % 0.2    Immature Granulocyte Rel %      0.0 - 0.5 % 0.3    Neutrophils Absolute      1.70 - 7.00 10*3/mm3 6.87    Lymphocytes Absolute      0.70 - 3.10 10*3/mm3 1.29    Monocytes Absolute      0.10 - 0.90 10*3/mm3 0.69    Eosinophils Absolute      0.00 - 0.40 10*3/mm3 0.11    Basophils Absolute      0.00 - 0.20 10*3/mm3 0.02    Immature Grans, Absolute      0.00 - 0.05 10*3/mm3 0.03    nRBC      0.0 - 0.2 /100 WBC 0.0    Glucose      65 - 99 mg/dL 103 (H)    BUN      6 - 20 mg/dL 14    Creatinine      0.57 - 1.00 mg/dL 1.11 (H)    Sodium      136 - 145 mmol/L 139    Potassium       3.5 - 5.2 mmol/L 4.6    Chloride      98 - 107 mmol/L 102    CO2      22.0 - 29.0 mmol/L 22.8    Calcium      8.6 - 10.5 mg/dL 9.9    Total Protein      6.0 - 8.5 g/dL 8.3    Albumin      3.5 - 5.2 g/dL 4.9    ALT (SGPT)      1 - 33 U/L 12    AST (SGOT)      1 - 32 U/L 15    Alkaline Phosphatase      39 - 117 U/L 80    Total Bilirubin      0.0 - 1.2 mg/dL 0.7    Globulin      gm/dL 3.4    A/G Ratio      g/dL 1.4    BUN/Creatinine Ratio      7.0 - 25.0  12.6    Anion Gap      5.0 - 15.0 mmol/L 14.2    eGFR      >60.0 mL/min/1.73 59.9 (L)    Lipase      13 - 60 U/L 27    Lactate      0.5 - 2.0 mmol/L 0.8       Legend:  (H) High  (L) Low    PROCEDURE: US GALLBLADDER-     HISTORY: RUQ pain, nausea and vomiting since last night, sent by PCP for  concern for gallbladder     Multiple transverse and longitudinal scans were performed.     FINDINGS: Numerous gallbladder polyps are noted all measuring 5 mm or  less. Appearance is suggestive of cholesterolosis. However there are  small stones also noted in the proximal gallbladder dependent portion.  No gallbladder wall thickening or surrounding fluid collection is seen.     There is a hyperechoic lesion of the anterior right liver measuring up  to 23 mm. Hemangioma is strongly favored. No intrahepatic duct  dilatation is identified.No extrahepatic biliary ductal dilatation is  identified.     IMPRESSION:     1. Cholesterolosis.  2. Tiny gallstones without acute gallbladder disease.  3. Mass within the liver measuring up to 23 mm favor hemangioma.  Recommend nonemergent MR followup for further confirmation using  hemangioma protocol.        This report was signed and finalized on 9/18/2023 2:22 PM by Douglas Bolton MD.      Assessment & Plan   Diagnoses and all orders for this visit:    1. Calculus of gallbladder without cholecystitis without obstruction (Primary)      I recommended to the patient that we proceed with laparoscopic cholecystectomy for definitive management of  symptoms related to underlying gallbladder disease, which have failed to respond to medical management.  We discussed the laparoscopic cholecystectomy procedure in detail along with the risks, benefits, and alternatives.  We specifically discussed the risks of bleeding, infection, conversion to an open procedure, postoperative bile leak, common bile duct injury, the need for ERCP (in the setting of bile leak, choledocholithiasis, etc.), and the risks related to anesthesia.  The patient understands these, and is willing to proceed.  My office will facilitate scheduling, and preadmission testing.  The patient understands the need to be n.p.o. after midnight the evening prior to scheduled surgical procedure.

## 2023-10-23 NOTE — H&P (VIEW-ONLY)
Subjective   Arnol Fritz is a 53 y.o. female.   Chief Complaint   Patient presents with    Cholelithiasis        History of Present Illness    Ms. Fritz comes the office today for evaluation and management of recently discovered cholelithiasis.  She was seen in the emergency department 9/18/2023 complaining of right lower quadrant abdominal pain, nausea, and vomiting that had occurred throughout the night before and continued into the day of evaluation.  She was evaluated in the emergency department and noted to have normal LFTs.  Her white blood cell count was also normal.  She subsequently underwent an ultrasound of the right upper quadrant demonstrating tiny gallstones without evidence of acute cholecystitis.  Note was made of cholesterolosis.  Additionally, a suspected 2.3 cm hemangioma was seen in the liver.    The patient reports associated symptoms of month ago.  She has been cautious regarding her diet, but continues to have symptoms.      The following portions of the patient's history were reviewed and updated as appropriate: allergies, current medications, past family history, past medical history, past social history, past surgical history, and problem list.    Patient Active Problem List   Diagnosis    Gastroesophageal reflux disease without esophagitis    Acne vulgaris    Lumbar sprain    Cervical sprain, initial encounter    PMB (postmenopausal bleeding)    Abnormal ultrasound of endometrium       Past Medical History:   Diagnosis Date    Abnormal Pap smear of cervix 1991    Acid reflux     Anemia     Fibroid 03/2023    Headache     History of Papanicolaou smear of cervix 07/28/2016    WNL    Injury of back     Injury of neck     Kidney stone 03/2023    Multiple gestation 02/12/1995 06/19/2001    PONV (postoperative nausea and vomiting)        Past Surgical History:   Procedure Laterality Date    D & C HYSTEROSCOPY N/A 04/27/2023    Procedure: DILATATION AND CURETTAGE HYSTEROSCOPY;  Surgeon:  Letty Hunt MD;  Location: [facility];  Service: Obstetrics/Gynecology;  Laterality: N/A;    D & C WITH SUCTION  2001    HYSTEROSCOPY      TUBAL ABDOMINAL LIGATION      WISDOM TOOTH EXTRACTION         Medications:     Current Outpatient Medications:     albuterol sulfate  (90 Base) MCG/ACT inhaler, Inhale 2 puffs Every 4 (Four) Hours As Needed for Wheezing. (Patient taking differently: Inhale 2 puffs As Needed for Wheezing.), Disp: 1 inhaler, Rfl: 0    dicyclomine (BENTYL) 10 MG capsule, Take 1 capsule by mouth 4 (Four) Times a Day As Needed for Abdominal Cramping., Disp: 60 capsule, Rfl: 1    estradiol (ESTRACE VAGINAL) 0.1 MG/GM vaginal cream, Use 0.5 grams intravaginally twice weekly to control symptoms., Disp: 127.5 g, Rfl: 3    fluticasone (Flonase) 50 MCG/ACT nasal spray, 2 sprays into the nostril(s) as directed by provider Daily. (Patient taking differently: 2 sprays into the nostril(s) as directed by provider As Needed.), Disp: 11.1 mL, Rfl: 2    ondansetron ODT (ZOFRAN-ODT) 4 MG disintegrating tablet, Place 1 tablet on the tongue Every 8 (Eight) Hours As Needed for Nausea or Vomiting., Disp: 12 tablet, Rfl: 0    Retin-A Micro Pump 0.08 % gel, Every Night., Disp: , Rfl:     spironolactone (ALDACTONE) 50 MG tablet, Take 1 tablet by mouth 2 (Two) Times a Day., Disp: , Rfl:     Allergies:   Allergies   Allergen Reactions    Sulfa Antibiotics Other (See Comments)     Joint swelling, and closing of airway    Solodyn [Minocycline Hcl Er] Other (See Comments)     Joint swelling         Family History   Problem Relation Age of Onset    Breast cancer Mother 65    Thyroid disease Mother     Heart attack Mother         duering chemo for lung cancer    Lung cancer Mother     Arthritis Father     Tongue cancer Father     Breast cancer Maternal Grandmother     Breast cancer Maternal Aunt     Breast cancer Maternal Aunt     Breast cancer Maternal Aunt     Ovarian cancer Neg Hx        Social History  "    Socioeconomic History    Marital status:    Tobacco Use    Smoking status: Never    Smokeless tobacco: Never    Tobacco comments:     None   Vaping Use    Vaping Use: Never used   Substance and Sexual Activity    Alcohol use: No    Drug use: No    Sexual activity: Defer     Birth control/protection: Tubal ligation     Comment: Tubal         Review of Systems   Constitutional:  Negative for diaphoresis, unexpected weight gain and unexpected weight loss.   HENT:  Negative for hearing loss, trouble swallowing and voice change.    Eyes:  Negative for visual disturbance.   Respiratory:  Negative for apnea, cough, chest tightness, shortness of breath and wheezing.    Cardiovascular:  Negative for chest pain, palpitations and leg swelling.   Gastrointestinal:  Positive for abdominal pain, nausea, vomiting, GERD and indigestion. Negative for abdominal distention, anal bleeding, blood in stool, constipation, diarrhea and rectal pain.   Endocrine: Negative for cold intolerance and heat intolerance.   Genitourinary:  Negative for difficulty urinating, dysuria and flank pain.   Musculoskeletal:  Negative for back pain and gait problem.   Skin:  Negative for color change, rash, skin lesions and wound.   Neurological:  Negative for dizziness, syncope, speech difficulty, weakness, light-headedness and numbness.   Hematological:  Negative for adenopathy. Does not bruise/bleed easily.   Psychiatric/Behavioral:  The patient is not nervous/anxious.    I have reviewed and confirmed the accuracy of the ROS as documented by the MA/LPN/RN Nina Tomas MD      Objective   /80   Pulse 93   Temp 99.2 °F (37.3 °C)   Resp 10   Ht 160 cm (63\")   Wt 78.9 kg (174 lb)   LMP 03/28/2022   SpO2 96%   BMI 30.82 kg/m²     Physical Exam  Constitutional:       Appearance: She is well-developed.   HENT:      Head: Normocephalic and atraumatic.   Eyes:      General: No scleral icterus.  Cardiovascular:      Rate and Rhythm: " Regular rhythm.   Pulmonary:      Effort: Pulmonary effort is normal.   Abdominal:      General: There is no distension.      Palpations: Abdomen is soft.      Tenderness: There is no abdominal tenderness.   Skin:     General: Skin is warm and dry.   Neurological:      Mental Status: She is alert and oriented to person, place, and time.   Psychiatric:         Behavior: Behavior normal.       Outside Records:  I have taken the opportunity to review the patient's available outside records in paper format, scanned format and those available on Care Everywhere    Results Review:  I have reviewed the entirety of the patient's clinical lab results.  I have also personally reviewed the relevant patient imaging.     Component      Latest Ref Rng 9/18/2023   WBC      3.40 - 10.80 10*3/mm3 9.01    RBC      3.77 - 5.28 10*6/mm3 5.17    Hemoglobin      12.0 - 15.9 g/dL 14.7    Hematocrit      34.0 - 46.6 % 43.5    MCV      79.0 - 97.0 fL 84.1    MCH      26.6 - 33.0 pg 28.4    MCHC      31.5 - 35.7 g/dL 33.8    RDW      12.3 - 15.4 % 14.0    RDW-SD      37.0 - 54.0 fl 42.9    MPV      6.0 - 12.0 fL 10.7    Platelets      140 - 450 10*3/mm3 276    Neutrophil Rel %      42.7 - 76.0 % 76.3 (H)    Lymphocyte Rel %      19.6 - 45.3 % 14.3 (L)    Monocyte Rel %      5.0 - 12.0 % 7.7    Eosinophil Rel %      0.3 - 6.2 % 1.2    Basophil Rel %      0.0 - 1.5 % 0.2    Immature Granulocyte Rel %      0.0 - 0.5 % 0.3    Neutrophils Absolute      1.70 - 7.00 10*3/mm3 6.87    Lymphocytes Absolute      0.70 - 3.10 10*3/mm3 1.29    Monocytes Absolute      0.10 - 0.90 10*3/mm3 0.69    Eosinophils Absolute      0.00 - 0.40 10*3/mm3 0.11    Basophils Absolute      0.00 - 0.20 10*3/mm3 0.02    Immature Grans, Absolute      0.00 - 0.05 10*3/mm3 0.03    nRBC      0.0 - 0.2 /100 WBC 0.0    Glucose      65 - 99 mg/dL 103 (H)    BUN      6 - 20 mg/dL 14    Creatinine      0.57 - 1.00 mg/dL 1.11 (H)    Sodium      136 - 145 mmol/L 139    Potassium       3.5 - 5.2 mmol/L 4.6    Chloride      98 - 107 mmol/L 102    CO2      22.0 - 29.0 mmol/L 22.8    Calcium      8.6 - 10.5 mg/dL 9.9    Total Protein      6.0 - 8.5 g/dL 8.3    Albumin      3.5 - 5.2 g/dL 4.9    ALT (SGPT)      1 - 33 U/L 12    AST (SGOT)      1 - 32 U/L 15    Alkaline Phosphatase      39 - 117 U/L 80    Total Bilirubin      0.0 - 1.2 mg/dL 0.7    Globulin      gm/dL 3.4    A/G Ratio      g/dL 1.4    BUN/Creatinine Ratio      7.0 - 25.0  12.6    Anion Gap      5.0 - 15.0 mmol/L 14.2    eGFR      >60.0 mL/min/1.73 59.9 (L)    Lipase      13 - 60 U/L 27    Lactate      0.5 - 2.0 mmol/L 0.8       Legend:  (H) High  (L) Low    PROCEDURE: US GALLBLADDER-     HISTORY: RUQ pain, nausea and vomiting since last night, sent by PCP for  concern for gallbladder     Multiple transverse and longitudinal scans were performed.     FINDINGS: Numerous gallbladder polyps are noted all measuring 5 mm or  less. Appearance is suggestive of cholesterolosis. However there are  small stones also noted in the proximal gallbladder dependent portion.  No gallbladder wall thickening or surrounding fluid collection is seen.     There is a hyperechoic lesion of the anterior right liver measuring up  to 23 mm. Hemangioma is strongly favored. No intrahepatic duct  dilatation is identified.No extrahepatic biliary ductal dilatation is  identified.     IMPRESSION:     1. Cholesterolosis.  2. Tiny gallstones without acute gallbladder disease.  3. Mass within the liver measuring up to 23 mm favor hemangioma.  Recommend nonemergent MR followup for further confirmation using  hemangioma protocol.        This report was signed and finalized on 9/18/2023 2:22 PM by Douglas Bolton MD.      Assessment & Plan   Diagnoses and all orders for this visit:    1. Calculus of gallbladder without cholecystitis without obstruction (Primary)      I recommended to the patient that we proceed with laparoscopic cholecystectomy for definitive management of  symptoms related to underlying gallbladder disease, which have failed to respond to medical management.  We discussed the laparoscopic cholecystectomy procedure in detail along with the risks, benefits, and alternatives.  We specifically discussed the risks of bleeding, infection, conversion to an open procedure, postoperative bile leak, common bile duct injury, the need for ERCP (in the setting of bile leak, choledocholithiasis, etc.), and the risks related to anesthesia.  The patient understands these, and is willing to proceed.  My office will facilitate scheduling, and preadmission testing.  The patient understands the need to be n.p.o. after midnight the evening prior to scheduled surgical procedure.

## 2023-10-26 ENCOUNTER — OFFICE VISIT (OUTPATIENT)
Dept: SURGERY | Facility: CLINIC | Age: 53
End: 2023-10-26
Payer: COMMERCIAL

## 2023-10-26 VITALS
TEMPERATURE: 99.2 F | HEIGHT: 63 IN | HEART RATE: 93 BPM | WEIGHT: 174 LBS | DIASTOLIC BLOOD PRESSURE: 80 MMHG | OXYGEN SATURATION: 96 % | BODY MASS INDEX: 30.83 KG/M2 | RESPIRATION RATE: 10 BRPM | SYSTOLIC BLOOD PRESSURE: 112 MMHG

## 2023-10-26 DIAGNOSIS — K80.20 CALCULUS OF GALLBLADDER WITHOUT CHOLECYSTITIS WITHOUT OBSTRUCTION: Primary | ICD-10-CM

## 2023-10-26 PROCEDURE — 99204 OFFICE O/P NEW MOD 45 MIN: CPT | Performed by: SURGERY

## 2023-10-30 ENCOUNTER — TELEPHONE (OUTPATIENT)
Dept: SURGERY | Facility: CLINIC | Age: 53
End: 2023-10-30
Payer: COMMERCIAL

## 2023-10-30 NOTE — TELEPHONE ENCOUNTER
Patient called stating she is scheduled for lap dequan 10/13/23 with Dr. Tomas. She states that she is scheduled for an MRI on 11/27. She states that she wants to make sure that her surgery will not interfere with her MRI that's been scheduled previously. Please advise [phone].

## 2023-11-02 ENCOUNTER — PREP FOR SURGERY (OUTPATIENT)
Dept: OTHER | Facility: HOSPITAL | Age: 53
End: 2023-11-02
Payer: COMMERCIAL

## 2023-11-02 ENCOUNTER — TELEPHONE (OUTPATIENT)
Dept: PREADMISSION TESTING | Facility: HOSPITAL | Age: 53
End: 2023-11-02

## 2023-11-02 DIAGNOSIS — K80.20 CALCULUS OF GALLBLADDER WITHOUT CHOLECYSTITIS WITHOUT OBSTRUCTION: Primary | ICD-10-CM

## 2023-11-02 RX ORDER — HEPARIN SODIUM 5000 [USP'U]/ML
5000 INJECTION, SOLUTION INTRAVENOUS; SUBCUTANEOUS ONCE
OUTPATIENT
Start: 2023-11-02 | End: 2023-11-02

## 2023-11-02 RX ORDER — SODIUM CHLORIDE, SODIUM LACTATE, POTASSIUM CHLORIDE, CALCIUM CHLORIDE 600; 310; 30; 20 MG/100ML; MG/100ML; MG/100ML; MG/100ML
50 INJECTION, SOLUTION INTRAVENOUS CONTINUOUS
OUTPATIENT
Start: 2023-11-02

## 2023-11-02 RX ORDER — SODIUM CHLORIDE 9 MG/ML
40 INJECTION, SOLUTION INTRAVENOUS AS NEEDED
OUTPATIENT
Start: 2023-11-02

## 2023-11-02 RX ORDER — SODIUM CHLORIDE 0.9 % (FLUSH) 0.9 %
10 SYRINGE (ML) INJECTION EVERY 12 HOURS SCHEDULED
OUTPATIENT
Start: 2023-11-02

## 2023-11-02 RX ORDER — SODIUM CHLORIDE 0.9 % (FLUSH) 0.9 %
10 SYRINGE (ML) INJECTION AS NEEDED
OUTPATIENT
Start: 2023-11-02

## 2023-11-03 ENCOUNTER — PRE-ADMISSION TESTING (OUTPATIENT)
Dept: PREADMISSION TESTING | Facility: HOSPITAL | Age: 53
End: 2023-11-03
Payer: COMMERCIAL

## 2023-11-03 VITALS — BODY MASS INDEX: 30.97 KG/M2 | WEIGHT: 174.8 LBS | HEIGHT: 63 IN

## 2023-11-03 LAB
DEPRECATED RDW RBC AUTO: 45.1 FL (ref 37–54)
ERYTHROCYTE [DISTWIDTH] IN BLOOD BY AUTOMATED COUNT: 14.2 % (ref 12.3–15.4)
HCT VFR BLD AUTO: 42.1 % (ref 34–46.6)
HGB BLD-MCNC: 13.6 G/DL (ref 12–15.9)
MCH RBC QN AUTO: 28 PG (ref 26.6–33)
MCHC RBC AUTO-ENTMCNC: 32.3 G/DL (ref 31.5–35.7)
MCV RBC AUTO: 86.8 FL (ref 79–97)
PLATELET # BLD AUTO: 284 10*3/MM3 (ref 140–450)
PMV BLD AUTO: 10.7 FL (ref 6–12)
RBC # BLD AUTO: 4.85 10*6/MM3 (ref 3.77–5.28)
WBC NRBC COR # BLD: 5.99 10*3/MM3 (ref 3.4–10.8)

## 2023-11-03 PROCEDURE — 85027 COMPLETE CBC AUTOMATED: CPT

## 2023-11-03 PROCEDURE — 36415 COLL VENOUS BLD VENIPUNCTURE: CPT

## 2023-11-03 RX ORDER — OMEPRAZOLE 20 MG/1
20 CAPSULE, DELAYED RELEASE ORAL DAILY
COMMUNITY

## 2023-11-03 NOTE — DISCHARGE INSTRUCTIONS
PAT PASS GIVEN/REVIEWED WITH PT.  VERBALIZED UNDERSTANDING OF THE FOLLOWING:  DO NOT EAT, DRINK, SMOKE, USE SMOKELESS TOBACCO OR CHEW GUM AFTER MIDNIGHT THE NIGHT BEFORE SURGERY.  THIS ALSO INCLUDES HARD CANDIES AND MINTS.    DO NOT SHAVE THE AREA TO BE OPERATED ON AT LEAST 48 HOURS PRIOR TO THE PROCEDURE.  DO NOT WEAR MAKE UP OR NAIL POLISH.  DO NOT LEAVE IN ANY PIERCING OR WEAR JEWELRY THE DAY OF SURGERY.      DO NOT USE ADHESIVES IF YOU WEAR DENTURES.    DO NOT WEAR EYE CONTACTS; BRING IN YOUR GLASSES.    ONLY TAKE MEDICATION THE MORNING OF YOUR PROCEDURE IF INSTRUCTED BY YOUR SURGEON WITH ENOUGH WATER TO SWALLOW THE MEDICATION.  IF YOUR SURGEON DID NOT SPECIFY WHICH MEDICATIONS TO TAKE, YOU WILL NEED TO CALL THEIR OFFICE FOR FURTHER INSTRUCTIONS AND DO AS THEY INSTRUCT.    LEAVE ANYTHING YOU CONSIDER VALUABLE AT HOME.    YOU WILL NEED TO ARRANGE FOR SOMEONE TO DRIVE YOU HOME AFTER SURGERY.  IT IS RECOMMENDED THAT YOU DO NOT DRIVE, WORK, DRINK ALCOHOL OR MAKE MAJOR DECISIONS FOR AT LEAST 24 HOURS AFTER YOUR PROCEDURE IS COMPLETE.      THE DAY OF YOUR PROCEDURE, BRING IN THE FOLLOWING IF APPLICABLE:   PICTURE ID AND INSURANCE/MEDICARE OR MEDICAID CARDS/ANY CO-PAY THAT MAY BE DUE   COPY OF ADVANCED DIRECTIVE/LIVING WILL/POWER OR    CPAP/BIPAP/INHALERS   SKIN PREP SHEET   YOUR PREADMISSION TESTING PASS (IF NOT A PHONE HISTORY)    Medication instructions given to pt by RN per anesthesia protocol.  Pt referred back to surgeon for further instructions if he/she is on any blood thinners.          Chlorhexadine wipes along with instruction/verification sheet given to pt.  Instructed pt to date, time, and initial the verification sheet once skin prep has been  completed, and to [place] [facility] [place] the [place].  Pt. Verbalizes understanding. Introduction to anesthesia video viewed by pt in PAT.

## 2023-11-09 ENCOUNTER — TELEPHONE (OUTPATIENT)
Dept: SURGERY | Facility: CLINIC | Age: 53
End: 2023-11-09
Payer: COMMERCIAL

## 2023-11-13 ENCOUNTER — ANESTHESIA EVENT (OUTPATIENT)
Dept: PERIOP | Facility: HOSPITAL | Age: 53
End: 2023-11-13
Payer: COMMERCIAL

## 2023-11-13 ENCOUNTER — HOSPITAL ENCOUNTER (OUTPATIENT)
Facility: HOSPITAL | Age: 53
Setting detail: HOSPITAL OUTPATIENT SURGERY
Discharge: HOME | End: 2023-11-13
Attending: SURGERY | Admitting: SURGERY
Payer: COMMERCIAL

## 2023-11-13 ENCOUNTER — ANESTHESIA (OUTPATIENT)
Dept: PERIOP | Facility: HOSPITAL | Age: 53
End: 2023-11-13
Payer: COMMERCIAL

## 2023-11-13 VITALS
OXYGEN SATURATION: 93 % | SYSTOLIC BLOOD PRESSURE: 95 MMHG | DIASTOLIC BLOOD PRESSURE: 55 MMHG | HEIGHT: 63 IN | RESPIRATION RATE: 18 BRPM | BODY MASS INDEX: 30.94 KG/M2 | HEART RATE: 66 BPM | TEMPERATURE: 98 F | WEIGHT: 174.6 LBS

## 2023-11-13 DIAGNOSIS — K80.20 CALCULUS OF GALLBLADDER WITHOUT CHOLECYSTITIS WITHOUT OBSTRUCTION: ICD-10-CM

## 2023-11-13 PROCEDURE — 25010000002 FENTANYL CITRATE PF 50 MCG/ML SOLUTION PREFILLED SYRINGE: Performed by: NURSE ANESTHETIST, CERTIFIED REGISTERED

## 2023-11-13 PROCEDURE — 25010000002 SUGAMMADEX 200 MG/2ML SOLUTION: Performed by: NURSE ANESTHETIST, CERTIFIED REGISTERED

## 2023-11-13 PROCEDURE — 25810000003 LACTATED RINGERS PER 1000 ML: Performed by: SURGERY

## 2023-11-13 PROCEDURE — 25010000002 KETOROLAC TROMETHAMINE PER 15 MG: Performed by: NURSE ANESTHETIST, CERTIFIED REGISTERED

## 2023-11-13 PROCEDURE — 25010000002 DEXAMETHASONE PER 1 MG: Performed by: NURSE ANESTHETIST, CERTIFIED REGISTERED

## 2023-11-13 PROCEDURE — 25010000002 HYDROMORPHONE 1 MG/ML SOLUTION: Performed by: NURSE ANESTHETIST, CERTIFIED REGISTERED

## 2023-11-13 PROCEDURE — 25010000002 PROPOFOL 200 MG/20ML EMULSION: Performed by: NURSE ANESTHETIST, CERTIFIED REGISTERED

## 2023-11-13 PROCEDURE — 25010000002 ONDANSETRON PER 1 MG: Performed by: NURSE ANESTHETIST, CERTIFIED REGISTERED

## 2023-11-13 PROCEDURE — 47562 LAPAROSCOPIC CHOLECYSTECTOMY: CPT | Performed by: SURGERY

## 2023-11-13 PROCEDURE — 25010000002 LIDOCAINE 1 % SOLUTION: Performed by: SURGERY

## 2023-11-13 PROCEDURE — 25010000002 BUPIVACAINE (PF) 0.25 % SOLUTION: Performed by: SURGERY

## 2023-11-13 PROCEDURE — 25010000002 AMPICILLIN-SULBACTAM PER 1.5 G: Performed by: SURGERY

## 2023-11-13 PROCEDURE — 25010000002 HEPARIN (PORCINE) PER 1000 UNITS: Performed by: SURGERY

## 2023-11-13 PROCEDURE — 25010000002 MIDAZOLAM PER 1MG: Performed by: NURSE ANESTHETIST, CERTIFIED REGISTERED

## 2023-11-13 DEVICE — LIGAMAX 5 MM ENDOSCOPIC MULTIPLE CLIP APPLIER
Type: IMPLANTABLE DEVICE | Site: ABDOMEN | Status: FUNCTIONAL
Brand: LIGAMAX

## 2023-11-13 RX ORDER — PROPOFOL 10 MG/ML
INJECTION, EMULSION INTRAVENOUS AS NEEDED
Status: DISCONTINUED | OUTPATIENT
Start: 2023-11-13 | End: 2023-11-13 | Stop reason: SURG

## 2023-11-13 RX ORDER — SODIUM CHLORIDE 9 MG/ML
40 INJECTION, SOLUTION INTRAVENOUS AS NEEDED
Status: DISCONTINUED | OUTPATIENT
Start: 2023-11-13 | End: 2023-11-13 | Stop reason: HOSPADM

## 2023-11-13 RX ORDER — MEPERIDINE HYDROCHLORIDE 25 MG/ML
12.5 INJECTION INTRAMUSCULAR; INTRAVENOUS; SUBCUTANEOUS
Status: DISCONTINUED | OUTPATIENT
Start: 2023-11-13 | End: 2023-11-13 | Stop reason: HOSPADM

## 2023-11-13 RX ORDER — IPRATROPIUM BROMIDE AND ALBUTEROL SULFATE 2.5; .5 MG/3ML; MG/3ML
3 SOLUTION RESPIRATORY (INHALATION) ONCE AS NEEDED
Status: DISCONTINUED | OUTPATIENT
Start: 2023-11-13 | End: 2023-11-13 | Stop reason: HOSPADM

## 2023-11-13 RX ORDER — MAGNESIUM HYDROXIDE 1200 MG/15ML
LIQUID ORAL AS NEEDED
Status: DISCONTINUED | OUTPATIENT
Start: 2023-11-13 | End: 2023-11-13 | Stop reason: HOSPADM

## 2023-11-13 RX ORDER — ONDANSETRON 2 MG/ML
INJECTION INTRAMUSCULAR; INTRAVENOUS AS NEEDED
Status: DISCONTINUED | OUTPATIENT
Start: 2023-11-13 | End: 2023-11-13 | Stop reason: SURG

## 2023-11-13 RX ORDER — LIDOCAINE HYDROCHLORIDE 10 MG/ML
INJECTION, SOLUTION INFILTRATION; PERINEURAL AS NEEDED
Status: DISCONTINUED | OUTPATIENT
Start: 2023-11-13 | End: 2023-11-13 | Stop reason: HOSPADM

## 2023-11-13 RX ORDER — KETOROLAC TROMETHAMINE 30 MG/ML
INJECTION, SOLUTION INTRAMUSCULAR; INTRAVENOUS AS NEEDED
Status: DISCONTINUED | OUTPATIENT
Start: 2023-11-13 | End: 2023-11-13 | Stop reason: SURG

## 2023-11-13 RX ORDER — ONDANSETRON 2 MG/ML
4 INJECTION INTRAMUSCULAR; INTRAVENOUS ONCE AS NEEDED
Status: DISCONTINUED | OUTPATIENT
Start: 2023-11-13 | End: 2023-11-13 | Stop reason: HOSPADM

## 2023-11-13 RX ORDER — MIDAZOLAM HYDROCHLORIDE 2 MG/2ML
INJECTION, SOLUTION INTRAMUSCULAR; INTRAVENOUS AS NEEDED
Status: DISCONTINUED | OUTPATIENT
Start: 2023-11-13 | End: 2023-11-13 | Stop reason: SURG

## 2023-11-13 RX ORDER — PROCHLORPERAZINE EDISYLATE 5 MG/ML
10 INJECTION INTRAMUSCULAR; INTRAVENOUS ONCE AS NEEDED
Status: DISCONTINUED | OUTPATIENT
Start: 2023-11-13 | End: 2023-11-13 | Stop reason: HOSPADM

## 2023-11-13 RX ORDER — BUPIVACAINE HYDROCHLORIDE 2.5 MG/ML
INJECTION, SOLUTION EPIDURAL; INFILTRATION; INTRACAUDAL AS NEEDED
Status: DISCONTINUED | OUTPATIENT
Start: 2023-11-13 | End: 2023-11-13 | Stop reason: HOSPADM

## 2023-11-13 RX ORDER — HEPARIN SODIUM 5000 [USP'U]/ML
5000 INJECTION, SOLUTION INTRAVENOUS; SUBCUTANEOUS ONCE
Status: COMPLETED | OUTPATIENT
Start: 2023-11-13 | End: 2023-11-13

## 2023-11-13 RX ORDER — HYDROCODONE BITARTRATE AND ACETAMINOPHEN 7.5; 325 MG/1; MG/1
1 TABLET ORAL ONCE AS NEEDED
Status: DISCONTINUED | OUTPATIENT
Start: 2023-11-13 | End: 2023-11-13 | Stop reason: HOSPADM

## 2023-11-13 RX ORDER — SODIUM CHLORIDE 0.9 % (FLUSH) 0.9 %
10 SYRINGE (ML) INJECTION AS NEEDED
Status: DISCONTINUED | OUTPATIENT
Start: 2023-11-13 | End: 2023-11-13 | Stop reason: HOSPADM

## 2023-11-13 RX ORDER — FENTANYL CITRATE 50 UG/ML
INJECTION, SOLUTION INTRAMUSCULAR; INTRAVENOUS AS NEEDED
Status: DISCONTINUED | OUTPATIENT
Start: 2023-11-13 | End: 2023-11-13 | Stop reason: SURG

## 2023-11-13 RX ORDER — ROCURONIUM BROMIDE 50 MG/5 ML
SYRINGE (ML) INTRAVENOUS AS NEEDED
Status: DISCONTINUED | OUTPATIENT
Start: 2023-11-13 | End: 2023-11-13 | Stop reason: SURG

## 2023-11-13 RX ORDER — SODIUM CHLORIDE, SODIUM LACTATE, POTASSIUM CHLORIDE, CALCIUM CHLORIDE 600; 310; 30; 20 MG/100ML; MG/100ML; MG/100ML; MG/100ML
50 INJECTION, SOLUTION INTRAVENOUS CONTINUOUS
Status: DISCONTINUED | OUTPATIENT
Start: 2023-11-13 | End: 2023-11-13 | Stop reason: HOSPADM

## 2023-11-13 RX ORDER — LIDOCAINE HCL/PF 100 MG/5ML
SYRINGE (ML) INJECTION AS NEEDED
Status: DISCONTINUED | OUTPATIENT
Start: 2023-11-13 | End: 2023-11-13 | Stop reason: SURG

## 2023-11-13 RX ORDER — SODIUM CHLORIDE 0.9 % (FLUSH) 0.9 %
10 SYRINGE (ML) INJECTION EVERY 12 HOURS SCHEDULED
Status: DISCONTINUED | OUTPATIENT
Start: 2023-11-13 | End: 2023-11-13 | Stop reason: HOSPADM

## 2023-11-13 RX ORDER — HYDROCODONE BITARTRATE AND ACETAMINOPHEN 7.5; 325 MG/1; MG/1
1 TABLET ORAL EVERY 4 HOURS PRN
Qty: 12 TABLET | Refills: 0 | Status: SHIPPED | OUTPATIENT
Start: 2023-11-13

## 2023-11-13 RX ORDER — DEXAMETHASONE SODIUM PHOSPHATE 4 MG/ML
INJECTION, SOLUTION INTRA-ARTICULAR; INTRALESIONAL; INTRAMUSCULAR; INTRAVENOUS; SOFT TISSUE AS NEEDED
Status: DISCONTINUED | OUTPATIENT
Start: 2023-11-13 | End: 2023-11-13 | Stop reason: SURG

## 2023-11-13 RX ADMIN — PROPOFOL 200 MG: 10 INJECTION, EMULSION INTRAVENOUS at 11:16

## 2023-11-13 RX ADMIN — Medication 30 MG: at 11:16

## 2023-11-13 RX ADMIN — LIDOCAINE HYDROCHLORIDE 50 MG: 20 INJECTION INTRAVENOUS at 11:16

## 2023-11-13 RX ADMIN — FENTANYL CITRATE 50 MCG: 50 INJECTION, SOLUTION INTRAMUSCULAR; INTRAVENOUS at 11:35

## 2023-11-13 RX ADMIN — ONDANSETRON 4 MG: 2 INJECTION INTRAMUSCULAR; INTRAVENOUS at 11:24

## 2023-11-13 RX ADMIN — SODIUM CHLORIDE, POTASSIUM CHLORIDE, SODIUM LACTATE AND CALCIUM CHLORIDE: 600; 310; 30; 20 INJECTION, SOLUTION INTRAVENOUS at 11:54

## 2023-11-13 RX ADMIN — SUGAMMADEX 160 MG: 100 INJECTION, SOLUTION INTRAVENOUS at 11:58

## 2023-11-13 RX ADMIN — Medication 3 G: at 11:20

## 2023-11-13 RX ADMIN — FENTANYL CITRATE 50 MCG: 50 INJECTION, SOLUTION INTRAMUSCULAR; INTRAVENOUS at 11:20

## 2023-11-13 RX ADMIN — DEXAMETHASONE SODIUM PHOSPHATE 4 MG: 4 INJECTION INTRA-ARTICULAR; INTRALESIONAL; INTRAMUSCULAR; INTRAVENOUS; SOFT TISSUE at 11:24

## 2023-11-13 RX ADMIN — HEPARIN SODIUM 5000 UNITS: 5000 INJECTION INTRAVENOUS; SUBCUTANEOUS at 11:07

## 2023-11-13 RX ADMIN — MIDAZOLAM HYDROCHLORIDE 2 MG: 1 INJECTION, SOLUTION INTRAMUSCULAR; INTRAVENOUS at 11:14

## 2023-11-13 RX ADMIN — SODIUM CHLORIDE, POTASSIUM CHLORIDE, SODIUM LACTATE AND CALCIUM CHLORIDE 50 ML/HR: 600; 310; 30; 20 INJECTION, SOLUTION INTRAVENOUS at 09:27

## 2023-11-13 RX ADMIN — HYDROMORPHONE HYDROCHLORIDE 0.5 MG: 1 INJECTION, SOLUTION INTRAMUSCULAR; INTRAVENOUS; SUBCUTANEOUS at 13:03

## 2023-11-13 RX ADMIN — KETOROLAC TROMETHAMINE 30 MG: 30 INJECTION, SOLUTION INTRAMUSCULAR at 11:51

## 2023-11-13 NOTE — ANESTHESIA POSTPROCEDURE EVALUATION
Patient: Arnol Fritz    Procedure Summary       Date: 11/13/23 Room / Location: [facility] OR  /  RE OR    Anesthesia Start: 1112 Anesthesia Stop: 1213    Procedure: CHOLECYSTECTOMY LAPAROSCOPIC (Abdomen) Diagnosis:       Calculus of gallbladder without cholecystitis without obstruction      (Calculus of gallbladder without cholecystitis without obstruction [K80.20])    Surgeons: Nina Tomas MD Provider: Faina Kendall CRNA    Anesthesia Type: general ASA Status: 3            Anesthesia Type: general    Vitals  Vitals Value Taken Time   /72 11/13/23 1235   Temp 97.2 °F (36.2 °C) 11/13/23 1216   Pulse 60 11/13/23 1240   Resp 18 11/13/23 1225   SpO2 97 % 11/13/23 1240   Vitals shown include unfiled device data.        Post Anesthesia Care and Evaluation    Patient location during evaluation: PACU  Patient participation: complete - patient participated  Level of consciousness: awake and alert  Pain score: 2  Pain management: satisfactory to patient    Airway patency: patent  Anesthetic complications: No anesthetic complications  PONV Status: none  Cardiovascular status: acceptable and stable  Respiratory status: acceptable  Hydration status: acceptable    Comments: Vitals signs as noted in nursing documentation as per protocol.

## 2023-11-13 NOTE — LETTER
November 13, 2023     Patient: Arnol Fritz   YOB: 1970   Date of Visit: 11/13/2023       To Whom It May Concern:    It is my medical opinion that Arnol Fritz may return to work on 11/16/2023 .           Sincerely,      No name on file    CC:   No Recipients

## 2023-11-13 NOTE — LETTER
November 13, 2023     Patient: Arnol Fritz   YOB: 1970   Date of Visit: 11/2/2023       To Whom It May Concern:    It is my medical opinion that Arnol Fritz may return to work on 11/16/2023 .           Sincerely,            CC:   No Recipients

## 2023-11-13 NOTE — ANESTHESIA PREPROCEDURE EVALUATION
Anesthesia Evaluation     Patient summary reviewed and Nursing notes reviewed   history of anesthetic complications:   NPO Solid Status: > 8 hours  NPO Liquid Status: > 8 hours           Airway   Mallampati: II  TM distance: >3 FB  Neck ROM: full  Possible difficult intubation  Dental      Pulmonary    (+) pneumonia ,  Cardiovascular         Neuro/Psych  (+) headaches  GI/Hepatic/Renal/Endo    (+) GERD, renal disease- stones    Musculoskeletal     (+) back pain  Abdominal    Substance History      OB/GYN    (-)  Pregnant        Other   arthritis,     ROS/Med Hx Other: Abnormal ultrasound of endometrium  Acne vulgaris  Calculus of gallbladder without cholecystitis without obstruction  Cervical sprain, initial encounter  Gastroesophageal reflux disease without esophagitis  Lumbar sprain  PMB (postmenopausal bleeding)                  Anesthesia Plan    ASA 3     general     intravenous induction     Anesthetic plan, risks, benefits, and alternatives have been provided, discussed and informed consent has been obtained with: patient.  Pre-procedure education provided  Plan discussed with CRNA.    CODE STATUS:

## 2023-11-13 NOTE — OP NOTE
PROCEDURE DATE:11/13/2023       SURGEON: Nina Tomas MD, FACS    PREOPERATIVE DIAGNOSIS: Calculus of gallbladder without cholecystitis without obstruction [K80.20]    POSTOPERATIVE DIAGNOSIS: Same    PROCEDURE: Laparoscopic cholecystectomy    ANESTHESIA: general    EBL: 5mL    IMPLANTS:    Implant Name Type Inv. Item Serial No.  Lot No. LRB No. Used Action   CLIPAPPLR M/ ENDO [id number] 5MM 33CM MD/LG - [id number] Implant CLIPAPPLR M/ ENDO [id number] 5MM 33CM MD/LG  [employer]  DIV OF J [employer] J [id number] N/A 1 Implanted       SPECIMENS:   Specimens       ID Source Type Tests Collected By Collected At Frozen?    A Gallbladder Tissue TISSUE EXAM, [facility] ([facility], [facility])   Meera Arrieta RN 11/13/23 1147     Description: gallbladder with contents    This specimen was not marked as sent.       INDICATIONS FOR THE PROCEDURE: Ms. Fritz is a 53-year-old female patient recently evaluated my office for symptoms consistent with recurrent biliary colic, and imaging demonstrating gallstones during her recent emergency department visit for the same.  Laparoscopic cholecystectomy was recommended for definitive management of underlying gallbladder disease which had failed to respond to medical management.  She was counseled regarding the risk, benefits, and alternatives to the surgical procedure, and has provided her informed consent to proceed.    DESCRIPTION OF THE PROCEDURE:  The patient was seen and examined in the preoperative holding area on the day of surgery and there are no changes to the medical history.  The patient was then taken to the operating room and placed supine on the operating table where a timeout is performed using the WHO checklist.  The patient received appropriate preoperative antibiotics as well as subcutaneous heparin for DVT prophylaxis.    Following the satisfactory induction of general anesthesia the patient's abdomen was prepped and draped in the usual sterile fashion.  A vertical  incision was made just above the umbilicus and was carried through the soft tissue to the level of the fascia.  The fascia was grasped and elevated between Kocher clamps and incised sharply with a knife.  Entry was confirmed into the peritoneum visually and there was no bowel visible in the vicinity of this incision.  Two stay sutures of 0 Vicryl were placed in either side of the fascia and a Ortiz cannula was inserted under direct visualization.  The sutures were used to secure the cannula.    The abdomen was insufflated with carbon dioxide to a pressure of 15 mmHg and the laparoscope was introduced.  The abdomen was inspected and no injuries were noted from initial trocar placement.  Three additional 5 mm ports were then placed in the subxiphoid, right subcostal, and right upper quadrant positions under direct visualization.  The patient was then placed into the reverse Trendelenburg position with the left side down.    Filmy adhesions between the gallbladder and omentum were freed using blunt dissection.  The dome of the gallbladder was then grasped and retracted cephalad up over the dome of the liver.  The infundibulum of the gallbladder was grasped and retracted laterally in order to splay out the triangle of Calot.  The peritoneum overlying the gallbladder infundibulum was incised with Bovie electrocautery and the cystic duct and cystic artery were identified and circumferentially skeletonized.  The cystic duct and cystic artery were completely circumferentially dissected until a critical view of safety was obtained and once this was done they were doubly clipped and divided close to the gallbladder.    The gallbladder was then dissected free from its peritoneal attachments to the liver using Bovie electrocautery.  Hemostasis was ensured using electrocautery.  Once the gallbladder was completed freed from the undersurface of liver was placed into an Endo Catch bag.  The gallbladder fossa was then reinspected  and copiously irrigated with saline and hemostasis was ensured.  The cystic duct stump and cystic artery stump were reinspected and noted to be secure.  Following this the upper abdominal trocars were removed under direct visualization and no bleeding was noted.  The laparoscope was withdrawn and the abdomen was desufflated.  The Ortiz cannula was removed out of the umbilical port site followed by the gallbladder which was completely contained within the Endo Catch bag.  This was passed off the field as specimen.  The fascia of the umbilical port site was then closed using a 0 Vicryl suture placed in a figure-of-eight fashion ×2.  The skin of all incisions was closed using a 5-0 PDS suture placed in a subcuticular fashion.  0.25% Marcaine was injected into the wounds for postoperative analgesia.  Mastisol and steri strips were applied to the wounds and covered with dry sterile dressings.    There were no immediate complications noted and the procedure was well tolerated by the patient.  All sponge, needle,  and instrument  counts were correct at the conclusion of procedure.  Dr. Tomas was present scrubbed for the procedure and its entirety.  The patient was awakened from anesthesia and taken to the recovery room in good condition.

## 2023-11-13 NOTE — ANESTHESIA PROCEDURE NOTES
Airway  Urgency: elective    Date/Time: 11/13/2023 11:17 AM  Airway not difficult    General Information and Staff    Patient location during procedure: OR  CRNA/CAA: Faina Kendall CRNA    Indications and Patient Condition  Indications for airway management: airway protection    Preoxygenated: yes  MILS not maintained throughout  Mask difficulty assessment: 1 - vent by mask    Final Airway Details  Final airway type: endotracheal airway      Successful airway: ETT  Cuffed: yes   Successful intubation technique: direct laryngoscopy  Facilitating devices/methods: intubating stylet  Endotracheal tube insertion site: oral  Blade: Tenisha  Blade size: 3  ETT size (mm): 7.0  Cormack-Lehane Classification: grade IIb - view of arytenoids or posterior of glottis only  Placement verified by: chest auscultation and capnometry   Measured from: lips  ETT/EBT  to lips (cm): 20  Number of attempts at approach: 1  Assessment: lips, teeth, and gum same as pre-op and atraumatic intubation    Additional Comments  Negative epigastric sounds, Breath sound equal bilaterally with symmetric chest rise and fall

## 2023-11-15 LAB — REF LAB TEST METHOD: NORMAL

## 2023-11-27 ENCOUNTER — HOSPITAL ENCOUNTER (OUTPATIENT)
Dept: MRI IMAGING | Facility: HOSPITAL | Age: 53
Discharge: HOME | End: 2023-11-27
Admitting: NURSE PRACTITIONER
Payer: COMMERCIAL

## 2023-11-27 DIAGNOSIS — D18.03 HEMANGIOMA OF LIVER: ICD-10-CM

## 2023-11-27 PROCEDURE — 0 GADOBENATE DIMEGLUMINE 529 MG/ML SOLUTION: Performed by: NURSE PRACTITIONER

## 2023-11-27 PROCEDURE — A9577 INJ MULTIHANCE: HCPCS | Performed by: NURSE PRACTITIONER

## 2023-11-27 PROCEDURE — 74183 MRI ABD W/O CNTR FLWD CNTR: CPT

## 2023-11-27 RX ADMIN — GADOBENATE DIMEGLUMINE 15 ML: 529 INJECTION, SOLUTION INTRAVENOUS at 07:35

## 2023-11-27 NOTE — PROGRESS NOTES
"Subjective   Arnol Fritz is a 53 y.o. female.   Chief Complaint   Patient presents with    Post-op     Lap dequan        History of Present Illness     Ms. Fritz returns the office today for routine postoperative follow-up after undergoing a laparoscopic cholecystectomy on 11/13/2023.  The patient reports that she is doing well postoperatively and has no particular complaints.  She reports resolution of her preoperative symptoms.  She is tolerating a diet and having regular bowel movements without constipation or diarrhea.  Final pathology demonstrated evidence of chronic cholecystitis with cholelithiasis.    The following portions of the patient's history were reviewed and updated as appropriate: allergies, current medications, past family history, past medical history, past social history, past surgical history, and problem list.    Review of Systems    Objective   /78   Pulse 95   Temp 97.3 °F (36.3 °C) (Temporal)   Resp 18   Ht 160 cm (63\")   Wt 81.6 kg (179 lb 12.8 oz)   LMP 03/28/2022   SpO2 98%   BMI 31.85 kg/m²     Physical Exam  Constitutional:       Appearance: She is well-developed.   HENT:      Head: Normocephalic and atraumatic.   Eyes:      General: No scleral icterus.  Cardiovascular:      Rate and Rhythm: Regular rhythm.   Pulmonary:      Effort: Pulmonary effort is normal.   Abdominal:      General: There is no distension.      Palpations: Abdomen is soft.      Tenderness: There is no abdominal tenderness.      Comments: Port site incisions healing well   Musculoskeletal:      Cervical back: Neck supple.   Skin:     General: Skin is warm and dry.   Neurological:      Mental Status: She is alert and oriented to person, place, and time.   Psychiatric:         Behavior: Behavior normal.           Assessment & Plan   Diagnoses and all orders for this visit:    1. S/P laparoscopic cholecystectomy (Primary)    Other orders  -     ondansetron ODT (ZOFRAN-ODT) 4 MG disintegrating tablet; " Place 1 tablet on the tongue Every 6 (Six) Hours As Needed for Nausea or Vomiting.  Dispense: 25 tablet; Refill: 0      I had the pleasure of seeing Arnol Fritz in follow-up today for thefirst  postoperative visit following laparoscopic cholecystectomy for chronic cholecystitis with cholelithiasis.  Overall, Arnol Fritz  is enjoying uncomplicated recovery.  I do not anticipate any ongoing surgical issues and will return the patient back to the care of their PCP.  I have released Arnol Fritz to unrestricted physical activity beginning four weeks after her operative date. The patient may follow up in this office as needed.

## 2023-11-28 ENCOUNTER — TELEPHONE (OUTPATIENT)
Dept: OBSTETRICS AND GYNECOLOGY | Facility: CLINIC | Age: 53
End: 2023-11-28
Payer: COMMERCIAL

## 2023-11-28 NOTE — TELEPHONE ENCOUNTER
----- Message from Arnol Fritz sent at 11/28/2023  2:07 PM EST -----  Regarding: Estrogen Use  Contact: [phone]  Hi Dr. Hunt,   During some recent imaging related to a gallbladder issue, it was noted that I have a hemangioma on my liver.  The MRI results are here in my chart.  I was advised that this is often attributed to estrogen/estrogen replacement use.  I am wondering if this is a result of the Estradiol cream I was prescribed?   And if I should discontinue use?  Please let me know what your guidance would be.   Thank you,   Arnol Fritz

## 2023-11-30 ENCOUNTER — OFFICE VISIT (OUTPATIENT)
Dept: SURGERY | Facility: CLINIC | Age: 53
End: 2023-11-30
Payer: COMMERCIAL

## 2023-11-30 VITALS
TEMPERATURE: 97.3 F | RESPIRATION RATE: 18 BRPM | SYSTOLIC BLOOD PRESSURE: 116 MMHG | DIASTOLIC BLOOD PRESSURE: 78 MMHG | BODY MASS INDEX: 31.86 KG/M2 | WEIGHT: 179.8 LBS | HEIGHT: 63 IN | HEART RATE: 95 BPM | OXYGEN SATURATION: 98 %

## 2023-11-30 DIAGNOSIS — Z90.49 S/P LAPAROSCOPIC CHOLECYSTECTOMY: Primary | ICD-10-CM

## 2023-11-30 PROCEDURE — 99024 POSTOP FOLLOW-UP VISIT: CPT | Performed by: SURGERY

## 2023-11-30 RX ORDER — ONDANSETRON 4 MG/1
4 TABLET, ORALLY DISINTEGRATING ORAL EVERY 6 HOURS PRN
Qty: 25 TABLET | Refills: 0 | Status: SHIPPED | OUTPATIENT
Start: 2023-11-30

## 2024-01-16 NOTE — PROGRESS NOTES
"Subjective   Arnol Fritz is a 53 y.o. female.   Chief Complaint   Patient presents with    Post-op Follow-up     Lap dequan        History of Present Illness   Ms. Fritz returns the office today for follow-up complaining of a protruding suture from one of her port site incisions following laparoscopic cholecystectomy on 11/13/2023.  She is otherwise doing well.       The following portions of the patient's history were reviewed and updated as appropriate: allergies, current medications, past family history, past medical history, past social history, past surgical history, and problem list.      Objective   /72   Pulse 81   Temp 97.5 °F (36.4 °C)   Ht 160 cm (62.99\")   Wt 84.5 kg (186 lb 3.2 oz)   LMP 03/28/2022   SpO2 98%   BMI 32.99 kg/m²     Physical Exam  Constitutional:       Appearance: She is well-developed.   HENT:      Head: Normocephalic and atraumatic.   Cardiovascular:      Rate and Rhythm: Regular rhythm.   Pulmonary:      Effort: Pulmonary effort is normal.   Abdominal:      General: There is no distension.      Palpations: Abdomen is soft.      Tenderness: There is no abdominal tenderness.      Comments: Small visible suture tail at the edge of the umbilical port site incision which was clipped and removed without difficulty.   Skin:     General: Skin is warm and dry.   Neurological:      Mental Status: She is alert and oriented to person, place, and time.   Psychiatric:         Behavior: Behavior normal.           Assessment & Plan   Diagnoses and all orders for this visit:    1. S/P laparoscopic cholecystectomy (Primary)      The residual protruding suture was removed without difficulty.  No additional postoperative difficulties identified.  The patient was given wound care instructions, and will follow-up as needed.           "

## 2024-01-18 ENCOUNTER — OFFICE VISIT (OUTPATIENT)
Dept: SURGERY | Facility: CLINIC | Age: 54
End: 2024-01-18
Payer: COMMERCIAL

## 2024-01-18 VITALS
HEART RATE: 81 BPM | SYSTOLIC BLOOD PRESSURE: 112 MMHG | HEIGHT: 63 IN | OXYGEN SATURATION: 98 % | DIASTOLIC BLOOD PRESSURE: 72 MMHG | BODY MASS INDEX: 32.99 KG/M2 | WEIGHT: 186.2 LBS | TEMPERATURE: 97.5 F

## 2024-01-18 DIAGNOSIS — Z90.49 S/P LAPAROSCOPIC CHOLECYSTECTOMY: Primary | ICD-10-CM

## 2024-01-18 PROCEDURE — 99024 POSTOP FOLLOW-UP VISIT: CPT | Performed by: SURGERY

## 2024-01-31 PROBLEM — K80.20 CALCULUS OF GALLBLADDER WITHOUT CHOLECYSTITIS WITHOUT OBSTRUCTION: Status: RESOLVED | Noted: 2023-11-02 | Resolved: 2024-01-31

## 2024-07-12 ENCOUNTER — TELEPHONE (OUTPATIENT)
Dept: OBSTETRICS AND GYNECOLOGY | Facility: CLINIC | Age: 54
End: 2024-07-12
Payer: COMMERCIAL

## 2024-07-12 DIAGNOSIS — N39.46 MIXED STRESS AND URGE URINARY INCONTINENCE: ICD-10-CM

## 2024-07-12 DIAGNOSIS — N90.89 PERINEAL FISSURE IN FEMALE: ICD-10-CM

## 2024-07-12 DIAGNOSIS — N95.2 POSTMENOPAUSAL ATROPHIC VAGINITIS: ICD-10-CM

## 2024-07-12 RX ORDER — ESTRADIOL 0.1 MG/G
CREAM VAGINAL
Qty: 127.5 G | Refills: 1 | Status: SHIPPED | OUTPATIENT
Start: 2024-07-12

## 2024-07-25 RX ORDER — ONDANSETRON 4 MG/1
4 TABLET, ORALLY DISINTEGRATING ORAL EVERY 6 HOURS PRN
Qty: 25 TABLET | Refills: 2 | Status: SHIPPED | OUTPATIENT
Start: 2024-07-25

## 2024-08-19 ENCOUNTER — HOSPITAL ENCOUNTER (OUTPATIENT)
Dept: MAMMOGRAPHY | Facility: HOSPITAL | Age: 54
Discharge: HOME | End: 2024-08-19
Admitting: OBSTETRICS & GYNECOLOGY
Payer: COMMERCIAL

## 2024-08-19 DIAGNOSIS — Z12.31 ENCOUNTER FOR SCREENING MAMMOGRAM FOR BREAST CANCER: ICD-10-CM

## 2024-08-19 PROCEDURE — 77063 BREAST TOMOSYNTHESIS BI: CPT

## 2024-08-19 PROCEDURE — 77067 SCR MAMMO BI INCL CAD: CPT

## 2025-07-23 ENCOUNTER — TRANSCRIBE ORDERS (OUTPATIENT)
Dept: ADMINISTRATIVE | Facility: HOSPITAL | Age: 55
End: 2025-07-23
Payer: COMMERCIAL

## 2025-07-23 DIAGNOSIS — Z12.31 ENCOUNTER FOR SCREENING MAMMOGRAM FOR MALIGNANT NEOPLASM OF BREAST: Primary | ICD-10-CM

## (undated) DEVICE — MONOPOLAR METZENBAUM SCISSOR, MINI BLADE TIP, DISPOSABLE: Brand: MONOPOLAR METZENBAUM SCISSOR, MINI BLADE TIP, DISPOSABLE

## (undated) DEVICE — DISPOSABLE MONOPOLAR ENDOSCOPIC CORD 10 FT. (3M): Brand: KIRWAN

## (undated) DEVICE — ENDOPOUCH RETRIEVER SPECIMEN RETRIEVAL BAGS: Brand: ENDOPOUCH RETRIEVER

## (undated) DEVICE — ENDOPATH XCEL BLADELESS TROCARS WITH STABILITY SLEEVES: Brand: ENDOPATH XCEL

## (undated) DEVICE — SOL NACL 0.9PCT 1000ML

## (undated) DEVICE — SPNG GZ STRL 2S 4X4 12PLY

## (undated) DEVICE — ST IRR CYSTO W/SPK 77IN LF

## (undated) DEVICE — SYR LL TP 10ML STRL

## (undated) DEVICE — BNDG ADHS PLSTC 1X3IN LF

## (undated) DEVICE — RICH GENERAL LAPAROSCOPY: Brand: MEDLINE INDUSTRIES, INC.

## (undated) DEVICE — SUT GUT CHRM 2/0 SH 27IN G123H

## (undated) DEVICE — UNDYED MONOFILAMENT (POLYDIOXANONE), ABSORBABLE SYNTHETIC SURGICAL SUTURE: Brand: PDS

## (undated) DEVICE — 2, DISPOSABLE SUCTION/IRRIGATOR WITHOUT DISPOSABLE TIP: Brand: STRYKEFLOW

## (undated) DEVICE — NDL HYPO ECLPS SFTY 22G 1 1/2IN

## (undated) DEVICE — SOL IRR H2O BTL 1000ML STRL

## (undated) DEVICE — ENDOPATH XCEL BLUNT TIP TROCARS WITH SMOOTH SLEEVES: Brand: ENDOPATH XCEL

## (undated) DEVICE — GLV SURG SENSICARE GREEN W/ALOE PF LF 6 STRL

## (undated) DEVICE — ADHS LIQ MASTISOL 2/3ML

## (undated) DEVICE — TP ELECTRD LAP L WR SPLIT33CM

## (undated) DEVICE — SLV SCD CALF HEMOFORCE DVT THERP REPROC MD

## (undated) DEVICE — SUT PDS 5/0 P3 18IN CLR PDP493G

## (undated) DEVICE — RICH MINOR LITHOTOMY: Brand: MEDLINE INDUSTRIES, INC.

## (undated) DEVICE — DRSNG SURESITE WNDW 4X4.5

## (undated) DEVICE — SUT VIC 0/0 UR6 27IN DYED J603H

## (undated) DEVICE — GLV SURG ULTRATOUCH BIOGEL/COAT PF LF SZ6 STRL

## (undated) DEVICE — GLV SURG BIOGEL M LTX PF 6 1/2

## (undated) DEVICE — ENDOPATH XCEL UNIVERSAL TROCAR STABLILITY SLEEVES: Brand: ENDOPATH XCEL